# Patient Record
Sex: MALE | Race: WHITE
[De-identification: names, ages, dates, MRNs, and addresses within clinical notes are randomized per-mention and may not be internally consistent; named-entity substitution may affect disease eponyms.]

---

## 2018-04-05 ENCOUNTER — HOSPITAL ENCOUNTER (EMERGENCY)
Dept: HOSPITAL 97 - ER | Age: 78
Discharge: HOME | End: 2018-04-05
Payer: COMMERCIAL
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
## 2018-04-05 DIAGNOSIS — S51.812A: Primary | ICD-10-CM
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
## 2018-04-05 PROCEDURE — 99282 EMERGENCY DEPT VISIT SF MDM: CPT

## 2018-04-05 PROCEDURE — 0JQH0ZZ REPAIR LEFT LOWER ARM SUBCUTANEOUS TISSUE AND FASCIA, OPEN APPROACH: ICD-10-PCS

## 2018-04-05 NOTE — ER
Nurse's Notes                                                                                     

 [facility]                                                                

Name: Grupo Dumont                                                                              

Age: 77 yrs                                                                                       

Sex: Male                                                                                         

: 1940                                                                                   

MRN: [id number]                                                                                   

Arrival Date: 2018                                                                          

Time: 14:29                                                                                       

Account#: [id number]                                                                            

Bed 26                                                                                            

Private MD: Jere Aquino                                                                         

Diagnosis: Laceration without foreign body of left forearm                                        

                                                                                                  

Presentation:                                                                                     

                                                                                             

14:37 Presenting complaint: Patient states: was moving sheet iron and got caught on LFA, has  iw  

      small laceration to LFA, dressed and not bleeding at this time. Transition of care:         

      patient was not received from another setting of care. Complicating Factors: There are      

      no complicating factors for this patient. Onset of symptoms was 2018. Care        

      prior to arrival: Bleeding of injury controlled.                                            

14:37 Method Of Arrival: Ambulatory                                                           iw  

14:37 Acuity: ANDRES 4                                                                           iw  

                                                                                                  

Historical:                                                                                       

- Allergies:                                                                                      

14:41 NKA;                                                                                    iw  

- Home Meds:                                                                                      

14:41 None [Active];                                                                          iw  

- PMHx:                                                                                           

14:41 None;                                                                                   iw  

- PSHx:                                                                                           

14:41 Hernia repair;                                                                          iw  

                                                                                                  

- Immunization history:: Adult Immunizations Last tetanus immunization: up to date.               

- Social history:: Smoking status: Patient/guardian denies using tobacco.                         

                                                                                                  

                                                                                                  

Screenin:47 Abuse screen: Denies threats or abuse. Nutritional screening: No deficits noted.        tl3 

      Tuberculosis screening: No symptoms or risk factors identified. Fall Risk None              

      identified.                                                                                 

                                                                                                  

Assessment:                                                                                       

14:47 General: Appears in no apparent distress. comfortable, slender, well groomed, well      tl3 

      developed, well nourished, Behavior is calm, cooperative, appropriate for age. Pain:        

      Complains of pain in dorsal aspect of left forearm. Neuro: Level of Consciousness is        

      awake, alert, obeys commands, confused, Oriented to person, place, time, situation,         

      Appropriate for age. Cardiovascular: Reports None Heart tones S1 S2 present Capillary       

      refill < 3 seconds in bilateral fingers. Respiratory: Airway is patent Trachea midline      

      Respiratory effort is even, unlabored, Respiratory pattern is regular, symmetrical,         

      Breath sounds are clear bilaterally. GI: No signs and/or symptoms were reported             

      involving the gastrointestinal system. : No signs and/or symptoms were reported           

      regarding the genitourinary system. EENT: No signs and/or symptoms were reported            

      regarding the EENT system. Derm: Skin is fragile, Bruising that is dark purple, on          

      multiple sites. Musculoskeletal: No deficits noted. Injury Description: Laceration          

      sustained to dorsal aspect of left forearm is clean, 0.5 to 2.5 cm long, not bleeding.      

15:25 Reassessment: Patient appears in no apparent distress at this time. No changes from     tl3 

      previously documented assessment. Patient and/or family updated on plan of care and         

      expected duration. Pain level reassessed. Patient is alert, oriented x 3, equal             

      unlabored respirations, skin warm/dry/pink. cathy at bedside suturing.                    

16:18 Reassessment: Patient appears in no apparent distress at this time. No changes from     tl3 

      previously documented assessment. Patient and/or family updated on plan of care and         

      expected duration. Pain level reassessed. Patient is alert, oriented x 3, equal             

      unlabored respirations, skin warm/dry/pink. wound dressed and discharge papers signed.      

                                                                                                  

Vital Signs:                                                                                      

14:41 Pulse 69; Resp 16 S; Temp 98.2; Pulse Ox 97% on R/A; Weight 90.26 kg; Height 6 ft.      iw  

      (182.88 cm); Pain 0/10;                                                                     

16:18  / 82; Pulse 67; Resp 18; Pulse Ox 100% ;                                         tl3 

14:41 Body Mass Index 26.99 (90.26 kg, 182.88 cm)                                             iw  

                                                                                                  

ED Course:                                                                                        

14:29 Patient arrived in ED.                                                                  mr  

14:29 Jere Aquino MD is Private Physician.                                                 mr  

14:36 Cathy Erickson NP is PHCP.                                                           pm1 

14:36 Daniel Degroot MD is Attending Physician.                                             pm1 

14:40 Triage completed.                                                                       iw  

14:41 Arm band placed on.                                                                     iw  

14:47 Kristina Jeffries, RN is Primary Nurse.                                                     tl3 

14:47 Patient has correct armband on for positive identification. pt sitting in chair.        tl3 

14:47 No provider procedures requiring assistance completed.                                  tl3 

15:43 Jere Aquino MD is Referral Physician.                                                pm1 

16:18 Patient did not have IV access during this emergency room visit.                        tl3 

                                                                                                  

Administered Medications:                                                                         

No medications were administered                                                                  

                                                                                                  

                                                                                                  

Outcome:                                                                                          

15:44 Discharge ordered by MD.                                                                pm1 

16:18 Discharged to home ambulatory.                                                          tl3 

16:18 Condition: good                                                                             

16:18 Discharge instructions given to patient, Instructed on discharge instructions, follow       

      up and referral plans. medication usage, Demonstrated understanding of instructions,        

      follow-up care, medications, wound care, Prescriptions given X 1.                           

16:19 Patient left the ED.                                                                    tl3 

                                                                                                  

Signatures:                                                                                       

Brittany Flores Irene, RN                     RN   iw                                                   

Cathy Erickson, ROBERT                    NP   pm1                                                  

Kristina Jeffries RN                       RN   tl3                                                  

                                                                                                  

**************************************************************************************************

## 2018-04-05 NOTE — EDPHYS
Physician Documentation                                                                           

 [facility]                                                                

Name: Grupo Dumont                                                                              

Age: 77 yrs                                                                                       

Sex: Male                                                                                         

: 1940                                                                                   

MRN: [id number]                                                                                   

Arrival Date: 2018                                                                          

Time: 14:29                                                                                       

Account#: [id number]                                                                            

Bed 26                                                                                            

Private MD: Jere Aquino ED Physician Daniel Degroot                                                                      

HPI:                                                                                              

                                                                                             

16:00 This 77 yrs old  Male presents to ER via Ambulatory with complaints of         pm1 

      Laceration To Arm.                                                                          

16:00 The patient has a laceration related to: working, occurred at work, and there are no    pm1 

      complicating factors. The laceration(s) is(are) located on the dorsal aspect of left        

      forearm. Onset: The symptoms/episode began/occurred 1 hour(s) ago. Associated signs and     

      symptoms: Pertinent negatives: deformity, heavy bleeding, numbness distal to injury,        

      suspected foreign body. Patient was working with some flashing and it cut his left          

      forearm. patient with recent tetanus immunization < 5 years old.                            

                                                                                                  

Historical:                                                                                       

- Allergies:                                                                                      

14:41 NKA;                                                                                    iw  

- Home Meds:                                                                                      

14:41 None [Active];                                                                          iw  

- PMHx:                                                                                           

14:41 None;                                                                                   iw  

- PSHx:                                                                                           

14:41 Hernia repair;                                                                          iw  

                                                                                                  

- Immunization history:: Adult Immunizations Last tetanus immunization: up to date.               

- Social history:: Smoking status: Patient/guardian denies using tobacco.                         

                                                                                                  

                                                                                                  

ROS:                                                                                              

16:00 Constitutional: Negative for fever, chills, and weight loss, Eyes: Negative for injury, pm1 

      pain, redness, and discharge, ENT: Negative for injury, pain, and discharge, Neck:          

      Negative for injury, pain, and swelling, Cardiovascular: Negative for chest pain,           

      palpitations, and edema, Respiratory: Negative for shortness of breath, cough,              

      wheezing, and pleuritic chest pain, Abdomen/GI: Negative for abdominal pain, nausea,        

      vomiting, diarrhea, and constipation, Back: Negative for injury and pain, MS/Extremity:     

      Negative for injury and deformity.                                                          

16:00 Neuro: Negative for headache, weakness, numbness, tingling, and seizure.                    

16:00 Skin: Positive for laceration(s), of the dorsal aspect of left forearm.                     

                                                                                                  

Exam:                                                                                             

16:00 Constitutional:  This is a well developed, well nourished patient who is awake, alert,  pm1 

      and in no acute distress. Head/Face:  Normocephalic, atraumatic. Chest/axilla:  Normal      

      chest wall appearance and motion.  Nontender with no deformity.  No lesions are             

      appreciated. Cardiovascular:  Regular rate and rhythm with a normal S1 and S2.  No          

      gallops, murmurs, or rubs.  Normal PMI, no JVD.  No pulse deficits. Respiratory:  Lungs     

      have equal breath sounds bilaterally, clear to auscultation and percussion.  No rales,      

      rhonchi or wheezes noted.  No increased work of breathing, no retractions or nasal          

      flaring. Back:  No spinal tenderness.  No costovertebral tenderness.  Full range of         

      motion.                                                                                     

16:00 Skin: injury, laceration(s), of the dorsal aspect of left forearm.                          

                                                                                                  

Vital Signs:                                                                                      

14:41 Pulse 69; Resp 16 S; Temp 98.2; Pulse Ox 97% on R/A; Weight 90.26 kg; Height 6 ft.      iw  

      (182.88 cm); Pain 0/10;                                                                     

16:18  / 82; Pulse 67; Resp 18; Pulse Ox 100% ;                                         tl3 

14:41 Body Mass Index 26.99 (90.26 kg, 182.88 cm)                                             iw  

                                                                                                  

Laceration:                                                                                       

15:46 Wound Repair of 3cm ( 1.2in ) subcutaneous laceration to dorsal aspect of left forearm. pm1 

      Linear shaped.. Distal neuro/vascular/tendon intact. Anesthesia: Local anesthetic           

      administered with 2 mls of 1% lidocaine. Wound prep: Extensive cleansing by me, Wound       

      irrigation by me, Wound explored extensively, Copious irrigation. Skin closed with 5        

      4-0 Prolene using simple sutures and sterile technique. Dressed with Bacitracin, 4x4's.     

      Patient tolerated well.                                                                     

                                                                                                  

MDM:                                                                                              

14:36 Patient medically screened.                                                             pm1 

15:42 Data reviewed: vital signs. Data interpreted: Pulse oximetry: on room air is 97 %.      pm1 

      Interpretation: normal. Counseling: I had a detailed discussion with the patient and/or     

      guardian regarding: the historical points, exam findings, and any diagnostic results        

      supporting the discharge/admit diagnosis, the need for outpatient follow up, to return      

      to the emergency department if symptoms worsen or persist or if there are any questions     

      or concerns that arise at home.                                                             

                                                                                                  

                                                                                             

14:50 Order name: Prolene, Sutures; Complete Time: 14:56                                      pm1 

                                                                                             

14:50 Order name: Dressing - Wound; Complete Time: 14:56                                      pm1 

                                                                                             

14:50 Order name: Gloves, Sterile; Complete Time: 14:56                                       pm1 

                                                                                             

14:50 Order name: Setup Suture Tray; Complete Time: 14:56                                     pm1 

                                                                                                  

Administered Medications:                                                                         

No medications were administered                                                                  

                                                                                                  

                                                                                                  

Disposition:                                                                                      

                                                                                             

10:24 Co-signature as Attending Physician, Daniel Degroot MD I agree with the assessment and  ana rosa 

      plan of care.                                                                               

                                                                                                  

Disposition:                                                                                      

18 15:44 Discharged to Home. Impression: Laceration without foreign body of left forearm.   

- Condition is Stable.                                                                            

- Discharge Instructions: Laceration Care, Adult.                                                 

- Prescriptions for Keflex 500 mg Oral Capsule - take 1 capsule by ORAL route every 12            

  hours for 10 days; 20 capsule.                                                                  

- Medication Reconciliation Form, Thank You Letter, Antibiotic Education form.                    

- Follow up: Emergency Department; When: 2 - 3 days; Reason: Recheck today's                      

  complaints, Continuance of care, Re-evaluation by your physician. Follow up: Jere Aquino MD; When: 7 - 10 days; Reason: Wound Recheck, Recheck today's complaints,                

  Continuance of care, Staple/Suture removal, Re-evaluation by your physician.                    

- Problem is new.                                                                                 

- Symptoms have improved.                                                                         

                                                                                                  

                                                                                                  

                                                                                                  

Signatures:                                                                                       

Daniel Degroot MD MD cha Williams, Irene, RN                     Josh Parisi NP                    NP   pm1                                                  

Kristina Jeffries, JOSE J                       RN   tl3                                                  

                                                                                                  

**************************************************************************************************

## 2018-08-27 ENCOUNTER — HOSPITAL ENCOUNTER (OUTPATIENT)
Dept: HOSPITAL 97 - OR | Age: 78
Discharge: HOME | End: 2018-08-27
Attending: OPHTHALMOLOGY
Payer: COMMERCIAL

## 2018-08-27 DIAGNOSIS — H25.11: Primary | ICD-10-CM

## 2018-08-27 DIAGNOSIS — E78.5: ICD-10-CM

## 2018-08-27 DIAGNOSIS — H52.221: ICD-10-CM

## 2018-08-27 DIAGNOSIS — Z87.891: ICD-10-CM

## 2018-08-27 DIAGNOSIS — H25.041: ICD-10-CM

## 2018-08-27 PROCEDURE — 0898XZZ DRAINAGE OF RIGHT CORNEA, EXTERNAL APPROACH: ICD-10-PCS

## 2018-08-27 PROCEDURE — 66984 XCAPSL CTRC RMVL W/O ECP: CPT

## 2018-08-27 PROCEDURE — 66999 UNLISTED PX ANT SEGMENT EYE: CPT

## 2018-08-27 PROCEDURE — 08RJ3JZ REPLACEMENT OF RIGHT LENS WITH SYNTHETIC SUBSTITUTE, PERCUTANEOUS APPROACH: ICD-10-PCS

## 2018-08-27 NOTE — OP
Date of Procedure:  08/27/2018



Surgeon:  Leatha Geiger MD



Anesthesiologist:  1. Andreia Alexander CRNA. 

2. Amrik Mckay M.D.



Preoperative Diagnosis:  Nuclear sclerotic cataract, right eye; posterior 
subcapsular cataract, right eye.



Operation Performed:  Phacoemulsification with intraocular lens implant, right 
eye.



Anesthesia:  Per cataract surgery.



Complications:  None.



Description Of Procedure:  In the operating room the patient was prepped and 
draped in the usual sterile fashion for ophthalmic surgery.  A lid speculum was 
placed in the right eye.  Two paracentesis sites were made superiorly and 
inferiorly in the limbal cornea.  Viscoat was placed in the anterior chamber 
and a crescent blade was used to make a corneal groove and tunnel, and a 
keratome was used to enter the anterior chamber.  Provisc was placed in the 
anterior chamber and a 360 degree capsulotomy was performed with a cystitome.  
The lens was hydrodissected with BSS and rotated freely.  The lens was removed 
with a stop and chop technique.  9.01 phaco CDE was used to remove the lens.  
Residual cortex was removed with the irrigation and aspiration.  Provisc was 
placed in the capsular bag.  A ZCB00 +22.0 lens was placed in the capsular bag 
without complications.  Irrigation and aspiration was used to remove residual 
viscoelastic.  The paracentesis sites were hydrated with BSS.  The wound and 
paracentesis sites were inspected and found to be watertight.  Vigamox 0.07 cc 
was placed intracamerally at the end of the procedure.  The eye was irrigated 
with balanced salt solution.  The eye was patched with a soft cotton patch and 
Brooks metal shield. 



The patient was returned to day surgery in good condition.



Comments:  A limbal relaxing incision was created at 19 degrees, a 35-degree 
arc was created with a 600 micron blade.  Akten  was placed in the eye in Day 
surgery and irrigated out the eye with BSS in the OR.  Preservative-free 1% 
lidocaine was placed in the anterior chamber prior to Viscoat.  During 
phacoemulsification, the conjunctiva began ballooning and was incised with 
Michelle scissors.  This was reapproximated with cautery at the end of the 
procedure.  Trace residual PSC remained at the end of the procedure.



Discharge Instructions:  Mr. Dumont is discharged to home in good condition 

and is to follow up with Dr. Geiger in the morning.





JOY/KARMEN

DD:  08/27/2018 10:00:49   Voice ID:  [id number]

DT:  08/27/2018 21:12:57   Report ID:  [id number]

RON

## 2018-08-27 NOTE — P.BOP
Preoperative diagnosis: Nuclear sclerotic cataract and regular astigmatism OD


Postoperative diagnosis: Same


Primary procedure: Phacoemulsification with IOL OD and limbal relaxing incision 

OD


Estimated blood loss: None


Anesthesia: Local (Topical with anesthesia for cataract surgery)


Complications: None


Implants: ZCB00 +22.0


Transferred to: Other (Day surgery)


Condition: Good

## 2020-01-09 ENCOUNTER — HOSPITAL ENCOUNTER (EMERGENCY)
Dept: HOSPITAL 97 - ER | Age: 80
Discharge: HOME | End: 2020-01-09
Payer: MEDICARE

## 2020-01-09 VITALS — SYSTOLIC BLOOD PRESSURE: 141 MMHG | OXYGEN SATURATION: 100 % | TEMPERATURE: 97.7 F | DIASTOLIC BLOOD PRESSURE: 87 MMHG

## 2020-01-09 DIAGNOSIS — R33.9: Primary | ICD-10-CM

## 2020-01-09 DIAGNOSIS — I10: ICD-10-CM

## 2020-01-09 PROCEDURE — 99284 EMERGENCY DEPT VISIT MOD MDM: CPT

## 2020-01-09 PROCEDURE — 81003 URINALYSIS AUTO W/O SCOPE: CPT

## 2020-01-09 PROCEDURE — 51702 INSERT TEMP BLADDER CATH: CPT

## 2020-01-09 PROCEDURE — 87086 URINE CULTURE/COLONY COUNT: CPT

## 2020-01-09 PROCEDURE — 87088 URINE BACTERIA CULTURE: CPT

## 2020-01-09 NOTE — ER
Nurse's Notes                                                                                     

 [facility]                                                                 

Name: Grupo Dumont                                                                              

Age: 79 yrs                                                                                       

Sex: Male                                                                                         

: 1940                                                                                   

MRN: [id number]                                                                                   

Arrival Date: 2020                                                                          

Time: 12:21                                                                                       

Account#: [id number]                                                                            

Bed 14                                                                                            

Private MD: Jere Aquino                                                                         

Diagnosis: Retention of urine                                                                     

                                                                                                  

Presentation:                                                                                     

                                                                                             

12:44 Presenting complaint: Patient states: pt reports having difficulty voiding for 1 day,   sg  

      pt states he is post op right knee replacement and has medications that have a diuretic     

      effect and is concerned because he is not emptying his bladder that it could be a           

      problem. Transition of care: patient was not received from another setting of care.         

      Onset of symptoms was 2020. Risk Assessment: Do you want to hurt yourself       

      or someone else? Patient reports no desire to harm self or others. Initial Sepsis           

      Screen: Does the patient meet any 2 criteria? HR > 90 bpm. Does the patient have a          

      suspected source of infection? No. Patient's initial sepsis screen is negative. Care        

      prior to arrival: None.                                                                     

12:44 Method Of Arrival: Ambulatory                                                           sg  

12:44 Acuity: ANDRES 3                                                                           sg  

                                                                                                  

Historical:                                                                                       

- Allergies:                                                                                      

12:43 NKA;                                                                                    sg  

- PMHx:                                                                                           

12:43 Hypertension;                                                                           sg  

- PSHx:                                                                                           

12:43 Hernia repair; Right knee sx; cataract sx;                                              sg  

                                                                                                  

- Immunization history:: Adult Immunizations unknown.                                             

- Social history:: Smoking status: Patient/guardian denies using tobacco.                         

- Ebola Screening: : No symptoms or risks identified at this time.                                

                                                                                                  

                                                                                                  

Screenin:30 Abuse screen: Denies threats or abuse. Denies injuries from another. Nutritional        ph  

      screening: No deficits noted. Tuberculosis screening: No symptoms or risk factors           

      identified. Fall Risk None identified.                                                      

                                                                                                  

Assessment:                                                                                       

13:30 General: Appears in no apparent distress. uncomfortable, slender, well groomed,         ph  

      Behavior is calm, cooperative, appropriate for age. Pain: Complains of pain in              

      suprapubic area. Neuro: Level of Consciousness is awake, alert, obeys commands,             

      Oriented to person, place, time, situation. Cardiovascular: Capillary refill < 3            

      seconds in bilateral fingers Patient's skin is warm and dry. Respiratory: Airway is         

      patent Respiratory effort is even, unlabored. : Reports inability to void, since this     

      morning. Derm: Skin is intact, is healthy with good turgor, Skin is pink, warm \T\ dry.     

      Musculoskeletal: Circulation, motion, and sensation intact. Range of motion: intact in      

      all extremities.                                                                            

14:15 Reassessment: Patient appears in no apparent distress at this time. Patient and/or      ph  

      family updated on plan of care and expected duration. Pain level reassessed. Patient is     

      alert, oriented x 3, equal unlabored respirations, skin warm/dry/pink. Pereira carheter       

      in place, 950 mL urine output noted, pt reports relief in suprapubic pressure, leg bag      

      placed to L leg.                                                                            

                                                                                                  

Vital Signs:                                                                                      

12:41  / 87; Pulse 107; Resp 18; Temp 97.7; Pulse Ox 100% on R/A; Weight 88.45 kg (R);  sg  

      Height 5 ft. 11 in. (180.34 cm);                                                            

14:20  / 72; Pulse 87; Resp 18; Temp 98.0; Pulse Ox 99% on R/A;                         ph  

12:41 Body Mass Index 27.20 (88.45 kg, 180.34 cm)                                               

                                                                                                  

ED Course:                                                                                        

12:21 Patient arrived in ED.                                                                  am2 

12:21 Jere Aquino MD is Private Physician.                                                 am2 

12:43 Arm band placed on.                                                                       

12:46 Triage completed.                                                                         

12:54 Daniel Degroot MD is Attending Physician.                                             [facility] 

12:58 Patient has correct armband on for positive identification. Bed in low position. Call   [facility] 

      light in reach. Side rails up X 1. Adult w/ patient. Pulse ox on. NIBP on.                  

12:58 Bladder scan completed. 342.                                                            5 

12:59 Urine Dipstick--Ancillary (enter results) Sent.                                         [facility] 

13:10 Jere Aquino MD is Referral Physician.                                                [facility] 

13:10 Jennifer Pisano MD is Referral Physician.                                              [facility] 

13:23 Vanesa Randle, RN is Primary Nurse.                                                    ph  

14:00 Pereira cath inserted, using sterile technique, 16 Fr., by me, balloon inflated, to       ph  

      gravity drainage, returned clear yellow urine. Patient tolerated poorly. 900 mL drained     

      from bladder.                                                                               

14:20 No provider procedures requiring assistance completed. Patient did not have IV access   ph  

      during this emergency room visit.                                                           

                                                                                                  

Administered Medications:                                                                         

14:00 Drug: Flomax 0.4 mg Route: PO;                                                          ph  

14:21 Follow up: Response: No adverse reaction                                                ph  

14:00 Drug: Cipro 500 mg Route: PO;                                                           ph  

14:22 Follow up: Response: No adverse reaction                                                ph  

14:10 Drug: Viscous Lidocaine Liquid (4 %) 5 ml Route: Mucous Membrane;                       ph  

14:23 Follow up: Response: No adverse reaction                                                ph  

                                                                                                  

                                                                                                  

Outcome:                                                                                          

13:10 Discharge ordered by MD. oseguera 

14:24 Patient left the ED.                                                                    ph  

14:24 Discharged to home ambulatory, with significant other.                                  ph  

14:24 Condition: improved                                                                         

14:24 Discharge instructions given to patient, significant other, Instructed on discharge         

      instructions, follow up and referral plans. medication usage, Pereira care Demonstrated       

      understanding of instructions, follow-up care, medications, Prescriptions given X 2.        

                                                                                                  

Signatures:                                                                                       

Marcial Hankins RN RN sg Anderson, Corey, MD MD cha Hall, Patricia, RN RN ph Martinez, Maria                              [facility]                                                  

Bia Barry                               [facility]                                                  

                                                                                                  

Corrections: (The following items were deleted from the chart)                                    

12:46 12:44 Initial Sepsis Screen: Does the patient meet any 2 criteria? HR > 90 bpm. Does    sg  

      the patient have a suspected source of infection? Yes: Dysuria/Frequency/Urgency/UTI sg     

                                                                                                  

**************************************************************************************************

## 2020-01-09 NOTE — XMS REPORT
Patient Summary Document

 Created on:2020



Patient:SID TRAORE

Sex:Male

:1940

External Reference #:[id number]





Demographics







 Address  [address] [place] [address]

 

 Home Phone  [phone]

 

 Work Phone  [phone]

 

 Email Address  [email or web address]

 

 Preferred Language  Unknown

 

 Marital Status  Unknown

 

 Sikh Affiliation  Unknown

 

 Race  Unknown

 

 Additional Race(s)  Unavailable

 

 Ethnic Group  Unknown









Author







 Organization  [facility]

 

 Address  [address]



   [place] [address]

 

 Phone  [phone]









Support







 Name  Relationship  Address  Phone

 

 RIANA TRAORE  Unavailable  [address]  [phone]



     [place] [address]  

 

 RIANA TRAORE  Unavailable  [address]   [phone]



     [place] [address]  









Care Team Providers







 Name  Role  Phone

 

 Unavailable  Unavailable  Unavailable









Payers







 Payer Name  Policy Type  Policy Number  Effective Date  Expiration Date







Problems

This patient has no known problems.



Allergies, Adverse Reactions, Alerts







 Allergy  Allergy  Status  Severity  Reaction(s)  Onset  Inactive  Treating  
Comments



 Name  Type        Date  Date  Clinician  

 

 No Known  DA  Active  U    2019      



 Allergies          -      



           00:00:0      



           0      

 

 No Known  DA  Active  U    2019      



 Allergies          -      



           00:00:0      



           0      







Medications

This patient has no known medications.



Results







 Test Description  Test Time  Test Comments  Text Results  Atomic Results  
Result Comments









 HGB   HCT  2019 07:36:00    









   Test Item  Value  Reference Range  Comments









 HEMOGLOBIN (test code=HGB)  12.5 g/dL  12-16  

 

 HEMATOCRIT (test code=HCT)  35.7 %  37-47  



AB HIV 1    21:54:00





 Test Item  Value  Reference Range  Comments

 

 AB HIV 1   2 (test  NONREACTIVE  NONREACTIVE  Done by [employer] 4th



 code=QBD11RC)      Gen HIV Ag/Ab Combo Screen



AB HIV  21:54:00





 Test Item  Value  Reference Range  Comments

 

 AB HIV 1 (test code=HIV1AB)  NONREACTIVE  NONREACTIVE  Done by [employer] 
4th



       Gen HIV Ag/Ab Combo Screen



PROTHROMBIN [id number] 17:03:00





 Test Item  Value  Reference Range  Comments

 

 PROTHROMBIN TIME PATIENT  10.9 secs  10.1-12.5  



 (test code=PTP)      

 

 INTERNATIONAL NORMAL RATIO  0.97  <2.0  RECOMMENDED THERAPEUTIC RANGE



 (test code=INR)      FOR ORAL



       ANTICOAGULANTTREATMENT:



          CONDITION



                INRProphylaxis of



       venous thrombosis in



       2.0 - 3.0   high-risk medical



       or surgical patientsTreatment



       of venous thrombosis



           2.0 - 3.0Prevention of



       embolism



       2.0 - 3.0Prevention of



       recurrent embolism, or



       3.0 - 4.5   patients with



       mechanical prosthetic



       intravascular valves



IS PATIENT ON ANTICOAGULANTS ? NHas Lab been notified if Patient is on Heparin 
Drip? NOIf Yes, orderCBC, OCCULT BLOOD, PT every other day NTHROMBOPLASTIN TIME 
[id number] 17:03:00





 Test Item  Value  Reference Range  Comments

 

 PTT ACTIVATED (test code=APTT)  30.6 secs  24.9-37.0  



IS PATIENT ON ANTICOAGULANTS ? NHas Lab been notified if Patient is on Heparin 
Drip? NOIf Yes, orderCBC, OCCULT BLOOD, PT every other day NCOMPREHENSIVE 
METABOLIC [id number] 17:02:00





 Test Item  Value  Reference Range  Comments

 

 SODIUM (test code=NA)  140 mmol/L  136-145  

 

 POTASSIUM (test code=K)  4.0 mmol/L  3.5-5.1  

 

 CHLORIDE (test code=CL)  101.0 mmol/L    

 

 CARBON DIOXIDE (test code=CO2)  26.4 mmol/L  21-32  

 

 GLUCOSE (test code=GLU)  100 mg/dL    

 

 BLOOD UREA NITROGEN (test  21 mg/dL  7-18  



 code=BUN)      

 

 GLOMERULAR FILTRATION RATE  79.0  >60  Unit of measure:



 (test code=GFR)      mL/min/1.73 [id number]



       Range:Healthy Adults



          >90 mL/min/1.73 m2 For



       Chronic Kidney Disease:



        Stage II     Mild



       Decrease in GFR



       60-90     Stage III



       Moderate Decrease in GFR



        30-59     Stage IV



       Severe Decrease in GFR



        15-29     Stage V



       Kidney Failure



         <15

 

 CREATININE (test code=CREAT)  0.92 mg/dL  0.55-1.30  

 

 TOTAL PROTEIN (test code=PROT)  7.4 g/dL  6.4-8.2  

 

 ALBUMIN (test code=ALB)  4.1 g/dL  3.4-5.0  

 

 GLOBULIN (test code=GLOB)  3.3 g/dL  2.2-4.2  

 

 ALBUMIN/GLOBULIN RATIO (test  1.2  0.7-2.0  



 code=A/G)      

 

 CALCIUM (test code=CA)  9.2 mg/dL  8.2-10.1  

 

 BILIRUBIN TOTAL (test  0.82 mg/dL  0.2-1.00  



 code=BILT)      

 

 SGOT/AST (test code=AST)  25.0 U/L  15-37  

 

 SGPT/ALT (test code=ALT)  31.0 U/L  12-78  Please note new normal



       range.

 

 ALKALINE PHOSPHATASE TOTAL  67 U/L    



 (test code=ALKP)      



CBC W/AUTO [id number] 16:21:00





 Test Item  Value  Reference Range  Comments

 

 WHITE BLOOD CELL (test code=WBC)  4.6 K/mm3  5.7-10.5  

 

 RED BLOOD CELL (test code=RBC)  4.65 M/mm3  4.2-5.4  

 

 HEMOGLOBIN (test code=HGB)  14.9 g/dL  12-16  

 

 HEMATOCRIT (test code=HCT)  42.0 %  37-47  

 

 MEAN CELL VOLUME (test code=MCV)  90 fL  80-98  

 

 MEAN CELL HGB (test code=MCH)  32.0 pg  27-34  

 

 MEAN CELL HGB CONCENTRATION (test code=MCHC)  35.5 g/dL  30.8-34.1  

 

 RED CELL DISTRIBUTION WIDTH (test code=RDW)  13.3 %  11-16  

 

 PLT (test code=PLT)  165 K/mm3  130-400  

 

 MEAN PLATELET VOLUME (test code=MPV)  12.0 fL  8.9-12.1  

 

 NEUTROPHIL % (test code=NT%)  64.3 %  45-70  

 

 LYMPHOCYTE % (test code=LY%)  25.1 %  20-40  

 

 MONOCYTE % (test code=MO%)  7.8 %  3-10  

 

 EOSINOPHIL % (test code=EO%)  1.5 %  1-5  

 

 BASOPHIL % (test code=BA%)  0.9 %  0.0-1.1  

 

 NEUTROPHIL # (test code=NT#)  2.95 K/mm3  2.00-7.50  

 

 LYMPHOCYTE # (test code=LY#)  1.15 K/mm3  1.50-4.00  

 

 MONOCYTE # (test code=MO#)  0.36 K/mm3  0.2-0.8  

 

 EOSINOPHIL # (test code=EO#)  0.07 K/mm3  0.04-0.4  

 

 BASOPHIL # (test code=BA#)  0.04 K/mm3  0.02-0.10  

 

 MANUAL DIFF REQUIRED (test code=MDIFF)  NO  MANUAL DIFF  

 

 NUCLEATED RED BLOOD CELL (test code=NRBC)  0 %  0-0

## 2020-01-11 ENCOUNTER — HOSPITAL ENCOUNTER (EMERGENCY)
Dept: HOSPITAL 97 - ER | Age: 80
Discharge: HOME | End: 2020-01-11
Payer: COMMERCIAL

## 2020-01-11 VITALS — DIASTOLIC BLOOD PRESSURE: 79 MMHG | SYSTOLIC BLOOD PRESSURE: 131 MMHG | OXYGEN SATURATION: 98 %

## 2020-01-11 DIAGNOSIS — I10: ICD-10-CM

## 2020-01-11 DIAGNOSIS — R33.9: Primary | ICD-10-CM

## 2020-01-11 PROCEDURE — 99281 EMR DPT VST MAYX REQ PHY/QHP: CPT

## 2020-01-11 NOTE — XMS REPORT
Patient Summary Document

 Created on:2020



Patient:SID TRAORE

Sex:Male

:1940

External Reference #:[id number]





Demographics







 Address  [address] [place] [address]

 

 Home Phone  [phone]

 

 Work Phone  [phone]

 

 Email Address  [email or web address]

 

 Preferred Language  Unknown

 

 Marital Status  Unknown

 

 Ep[employer] Affiliation  Unknown

 

 Race  Unknown

 

 Additional Race(s)  Unavailable

 

 Ethnic Group  Unknown









Author







 Organization  [facility]

 

 Address  [address]



   [place] [address]

 

 Phone  [phone]









Support







 Name  Relationship  Address  Phone

 

 RIANA TRAORE  Unavailable  [address]  [phone]



     [place] [address]  

 

 RIANA TRAORE  Unavailable  [address]   [phone]



     [place] [address]  









Care Team Providers







 Name  Role  Phone

 

 Unavailable  Unavailable  Unavailable









Payers







 Payer Name  Policy Type  Policy Number  Effective Date  Expiration Date







Problems

This patient has no known problems.



Allergies, Adverse Reactions, Alerts







 Allergy  Allergy  Status  Severity  Reaction(s)  Onset  Inactive  Treating  
Comments



 Name  Type        Date  Date  Clinician  

 

 No Known  DA  Active  U    2019      



 Allergies          -      



           00:00:0      



           0      

 

 No Known  DA  Active  U    2019      



 Allergies          -      



           00:00:0      



           0      







Medications

This patient has no known medications.



Results







 Test Description  Test Time  Test Comments  Text Results  Atomic Results  
Result Comments









 HGB   HCT  2019 07:36:00    









   Test Item  Value  Reference Range  Comments









 HEMOGLOBIN (test code=HGB)  12.5 g/dL  12-16  

 

 HEMATOCRIT (test code=HCT)  35.7 %  37-47  



AB HIV  21:54:00





 Test Item  Value  Reference Range  Comments

 

 AB HIV 1 (test code=HIV1AB)  NONREACTIVE  NONREACTIVE  Done by [employer] 
4th



       Gen HIV Ag/Ab Combo Screen



AB HIV 1    21:54:00





 Test Item  Value  Reference Range  Comments

 

 AB HIV 1   2 (test  NONREACTIVE  NONREACTIVE  Done by [employer] 4th



 code=BSY88CQ)      Gen HIV Ag/Ab Combo Screen



PROTHROMBIN [id number] 17:03:00





 Test Item  Value  Reference Range  Comments

 

 PROTHROMBIN TIME PATIENT  10.9 secs  10.1-12.5  



 (test code=PTP)      

 

 INTERNATIONAL NORMAL RATIO  0.97  <2.0  RECOMMENDED THERAPEUTIC RANGE



 (test code=INR)      FOR ORAL



       ANTICOAGULANTTREATMENT:



          CONDITION



                INRProphylaxis of



       venous thrombosis in



       2.0 - 3.0   high-risk medical



       or surgical patientsTreatment



       of venous thrombosis



           2.0 - 3.0Prevention of



       embolism



       2.0 - 3.0Prevention of



       recurrent embolism, or



       3.0 - 4.5   patients with



       mechanical prosthetic



       intravascular valves



IS PATIENT ON ANTICOAGULANTS ? NHas Lab been notified if Patient is on Heparin 
Drip? NOIf Yes, orderCBC, OCCULT BLOOD, PT every other day NTHROMBOPLASTIN TIME 
[id number] 17:03:00





 Test Item  Value  Reference Range  Comments

 

 PTT ACTIVATED (test code=APTT)  30.6 secs  24.9-37.0  



IS PATIENT ON ANTICOAGULANTS ? NHas Lab been notified if Patient is on Heparin 
Drip? NOIf Yes, orderCBC, OCCULT BLOOD, PT every other day NCOMPREHENSIVE 
METABOLIC [id number] 17:02:00





 Test Item  Value  Reference Range  Comments

 

 SODIUM (test code=NA)  140 mmol/L  136-145  

 

 POTASSIUM (test code=K)  4.0 mmol/L  3.5-5.1  

 

 CHLORIDE (test code=CL)  101.0 mmol/L    

 

 CARBON DIOXIDE (test code=CO2)  26.4 mmol/L  21-32  

 

 GLUCOSE (test code=GLU)  100 mg/dL    

 

 BLOOD UREA NITROGEN (test  21 mg/dL  7-18  



 code=BUN)      

 

 GLOMERULAR FILTRATION RATE  79.0  >60  Unit of measure:



 (test code=GFR)      mL/min/1.73 [id number]



       Range:Healthy Adults



          >90 mL/min/1.73 m2 For



       Chronic Kidney Disease:



        Stage II     Mild



       Decrease in GFR



       60-90     Stage III



       Moderate Decrease in GFR



        30-59     Stage IV



       Severe Decrease in GFR



        15-29     Stage V



       Kidney Failure



         <15

 

 CREATININE (test code=CREAT)  0.92 mg/dL  0.55-1.30  

 

 TOTAL PROTEIN (test code=PROT)  7.4 g/dL  6.4-8.2  

 

 ALBUMIN (test code=ALB)  4.1 g/dL  3.4-5.0  

 

 GLOBULIN (test code=GLOB)  3.3 g/dL  2.2-4.2  

 

 ALBUMIN/GLOBULIN RATIO (test  1.2  0.7-2.0  



 code=A/G)      

 

 CALCIUM (test code=CA)  9.2 mg/dL  8.2-10.1  

 

 BILIRUBIN TOTAL (test  0.82 mg/dL  0.2-1.00  



 code=BILT)      

 

 SGOT/AST (test code=AST)  25.0 U/L  15-37  

 

 SGPT/ALT (test code=ALT)  31.0 U/L  12-78  Please note new normal



       range.

 

 ALKALINE PHOSPHATASE TOTAL  67 U/L    



 (test code=ALKP)      



CBC W/AUTO [id number] 16:21:00





 Test Item  Value  Reference Range  Comments

 

 WHITE BLOOD CELL (test code=WBC)  4.6 K/mm3  5.7-10.5  

 

 RED BLOOD CELL (test code=RBC)  4.65 M/mm3  4.2-5.4  

 

 HEMOGLOBIN (test code=HGB)  14.9 g/dL  12-16  

 

 HEMATOCRIT (test code=HCT)  42.0 %  37-47  

 

 MEAN CELL VOLUME (test code=MCV)  90 fL  80-98  

 

 MEAN CELL HGB (test code=MCH)  32.0 pg  27-34  

 

 MEAN CELL HGB CONCENTRATION (test code=MCHC)  35.5 g/dL  30.8-34.1  

 

 RED CELL DISTRIBUTION WIDTH (test code=RDW)  13.3 %  11-16  

 

 PLT (test code=PLT)  165 K/mm3  130-400  

 

 MEAN PLATELET VOLUME (test code=MPV)  12.0 fL  8.9-12.1  

 

 NEUTROPHIL % (test code=NT%)  64.3 %  45-70  

 

 LYMPHOCYTE % (test code=LY%)  25.1 %  20-40  

 

 MONOCYTE % (test code=MO%)  7.8 %  3-10  

 

 EOSINOPHIL % (test code=EO%)  1.5 %  1-5  

 

 BASOPHIL % (test code=BA%)  0.9 %  0.0-1.1  

 

 NEUTROPHIL # (test code=NT#)  2.95 K/mm3  2.00-7.50  

 

 LYMPHOCYTE # (test code=LY#)  1.15 K/mm3  1.50-4.00  

 

 MONOCYTE # (test code=MO#)  0.36 K/mm3  0.2-0.8  

 

 EOSINOPHIL # (test code=EO#)  0.07 K/mm3  0.04-0.4  

 

 BASOPHIL # (test code=BA#)  0.04 K/mm3  0.02-0.10  

 

 MANUAL DIFF REQUIRED (test code=MDIFF)  NO  MANUAL DIFF  

 

 NUCLEATED RED BLOOD CELL (test code=NRBC)  0 %  0-0

## 2020-01-11 NOTE — EDPHYS
Physician Documentation                                                                           

 [facility]                                                                 

Name: Grupo Dumont                                                                              

Age: 79 yrs                                                                                       

Sex: Male                                                                                         

: 1940                                                                                   

MRN: [id number]                                                                                   

Arrival Date: 2020                                                                          

Time: 11:23                                                                                       

Account#: [id number]                                                                            

Bed 14                                                                                            

Private MD: Jere Aquino                                                                         

ED Physician Raciel Allen                                                                         

HPI:                                                                                              

                                                                                             

11:42 This 79 yrs old  Male presents to ER via Ambulatory with complaints of needs   rn  

      hernandez catheter pulled out.                                                                  

11:42 The patient presents with urinary symptoms. Onset: The symptoms/episode began/occurred  rn  

      2 day(s) ago. Modifying factors: The symptoms are alleviated by nothing, the symptoms       

      are aggravated by nothing. Severity of symptoms: At their worst the symptoms were mild,     

      in the emergency department the symptoms have improved. The patient has not experienced     

      similar symptoms in the past. The patient has been recently seen at the [facility] Emergency Department. Reports seen here 2 days ago for urinary       

      retention, had 800cc of urine in bladder when drained, no UTI, thought maybe from meds      

      after surgery, told to return in 2 days here for removal. No fever, is flowing well,        

      taking bactrim. .                                                                           

                                                                                                  

Historical:                                                                                       

- Allergies:                                                                                      

11: NKA;                                                                                    sv  

- PMHx:                                                                                           

11:26 Hypertension;                                                                           sv  

- PSHx:                                                                                           

11:26 Hernia repair; Right knee sx; cataract sx;                                              sv  

                                                                                                  

- Family history:: not pertinent.                                                                 

- Hospitalizations: : No recent hospitalization is reported.                                      

                                                                                                  

                                                                                                  

ROS:                                                                                              

11:42 Constitutional: Negative for fever, chills, and weight loss, Abdomen/GI: Negative for   rn  

      abdominal pain, nausea, vomiting, diarrhea, and constipation, : Negative for injury,      

      bleeding, discharge, and swelling.                                                          

                                                                                                  

Exam:                                                                                             

11:42 Constitutional:  This is a well developed, well nourished patient who is awake, alert,  rn  

      and in no acute distress.  Ambulatory to room without difficulty.  Abdomen/GI:  soft,       

      non-tender, non-distended Male :  Hernandez catheter in place and draining.                   

                                                                                                  

Vital Signs:                                                                                      

11:  / 79; Pulse 98; Resp 16; Pulse Ox 98% ; Weight 87.54 kg; Height 5 ft. 11 in.     sv  

      (180.34 cm);                                                                                

11: Body Mass Index 26.92 (87.54 kg, 180.34 cm)                                             sv  

                                                                                                  

MDM:                                                                                              

11:27 Patient medically screened.                                                             rn  

11:42 Differential diagnosis: urinary retention. Data reviewed: vital signs, nurses notes,    rn  

      old medical records, and as a result, I will discharge patient. Counseling: I had a         

      detailed discussion with the patient and/or guardian regarding: the historical points,      

      exam findings, and any diagnostic results supporting the discharge/admit diagnosis, the     

      need for outpatient follow up, to return to the emergency department if symptoms worsen     

      or persist or if there are any questions or concerns that arise at home. ED course:         

      offered removal of catheter and voiding trial, but patient concerned about having to        

      get hernandez placed again and does not want to go through that again. Told him had decent      

      chance of passing, but ultimately patient and family choose to go home, continue abx        

      and flomax as has only been 2 days, and will f/u with Dr. Pisano who he has seen before.     

      Most likely combination of prostate at baseline and medication recently. .                  

                                                                                                  

Administered Medications:                                                                         

No medications were administered                                                                  

                                                                                                  

                                                                                                  

Disposition:                                                                                      

20 11:47 Discharged to Home as Medical Screen. Impression: Urinary retention.               

- Condition is Stable.                                                                            

- Discharge Instructions: Hernandez Catheter Care, Adult, Acute Urinary Retention, Male.              

                                                                                                  

- Medication Reconciliation Form, Thank You Letter, Antibiotic Education, Prescription            

  Opioid Use form.                                                                                

- Follow up: Jennifer Pisano MD; When: 5 - 6 days; Reason: Recheck today's complaints,           

  Continuance of care, Re-evaluation by your physician.                                           

- Problem is new.                                                                                 

- Symptoms have improved.                                                                         

                                                                                                  

                                                                                                  

                                                                                                  

Signatures:                                                                                       

Blanca Salazar RN RN sv Gay, Steven, RN RN sg Nieto, Roman, MD MD   rn                                                   

                                                                                                  

Corrections: (The following items were deleted from the chart)                                    

12:00 11:47 2020 11:47 Discharged to Home as Medical Screen. Impression: Urinary        sg  

      retention. Condition is Stable. Forms are Medication Reconciliation Form, Thank You         

      Letter, Antibiotic Education, Prescription Opioid Use. Follow up: Jennifer Pisano; When:     

      5 - 6 days; Reason: Recheck today's complaints, Continuance of care, Re-evaluation by       

      your physician. Problem is new. Symptoms have improved. rn                                  

                                                                                                  

**************************************************************************************************

## 2020-01-11 NOTE — ER
Nurse's Notes                                                                                     

 [facility]                                                                 

Name: Grupo Dumont                                                                              

Age: 79 yrs                                                                                       

Sex: Male                                                                                         

: 1940                                                                                   

MRN: [id number]                                                                                   

Arrival Date: 2020                                                                          

Time: :                                                                                       

Account#: [id number]                                                                            

Bed 14                                                                                            

Private MD: Jere Aquino                                                                         

Diagnosis: Urinary retention                                                                      

                                                                                                  

Presentation:                                                                                     

                                                                                             

11:25 Presenting complaint: Patient states: needs urinary catheter removed that was placed    sv  

      here. Transition of care: patient was not received from another setting of care. Onset      

      of symptoms was 2020. Care prior to arrival: None.                              

11:25 Method Of Arrival: Ambulatory                                                           sv  

11:25 Acuity: ANDRES 4                                                                           sv  

                                                                                                  

Historical:                                                                                       

- Allergies:                                                                                      

: NKA;                                                                                    sv  

- PMHx:                                                                                           

11: Hypertension;                                                                           sv  

- PSHx:                                                                                           

11: Hernia repair; Right knee sx; cataract sx;                                              sv  

                                                                                                  

- Family history:: not pertinent.                                                                 

- Hospitalizations: : No recent hospitalization is reported.                                      

                                                                                                  

                                                                                                  

Vital Signs:                                                                                      

11:  / 79; Pulse 98; Resp 16; Pulse Ox 98% ; Weight 87.54 kg; Height 5 ft. 11 in.     sv  

      (180.34 cm);                                                                                

11: Body Mass Index 26.92 (87.54 kg, 180.34 cm)                                             sv  

                                                                                                  

ED Course:                                                                                        

:23 Patient arrived in ED.                                                                  as  

11:23 Jere Aquino MD is Private Physician.                                                 as  

11: Triage completed.                                                                       sv  

11:27 Raciel Allen MD is Attending Physician.                                                rn  

11:27 Marcial Hankins RN is Primary Nurse.                                                       sg  

11:27 Arm band placed on.                                                                     sv  

11:46 Jennifer Pisano MD is Referral Physician.                                              rn  

                                                                                                  

Administered Medications:                                                                         

No medications were administered                                                                  

                                                                                                  

                                                                                                  

Outcome:                                                                                          

11:47 Discharge ordered by MD.                                                                rn  

11:52 Medical screen evaluation completed per provider. Patient declined treatment.           sg  

11:52 Condition: good                                                                             

11:52 Instructed on follow up and referral plans.                                                 

12:00 Patient left the ED.                                                                    sg  

                                                                                                  

Signatures:                                                                                       

Blanca Salazar RN RN   sv                                                   

Marcial Hankins RN                         RN   sg                                                   

Adrianne Owen                             as                                                   

Raciel Allen MD MD   rn                                                   

                                                                                                  

**************************************************************************************************

## 2020-12-07 ENCOUNTER — HOSPITAL ENCOUNTER (INPATIENT)
Dept: HOSPITAL 97 - ER | Age: 80
LOS: 2 days | Discharge: HOME | DRG: 177 | End: 2020-12-09
Admitting: HOSPITALIST
Payer: COMMERCIAL

## 2020-12-07 VITALS — BODY MASS INDEX: 27.8 KG/M2

## 2020-12-07 DIAGNOSIS — Z79.899: ICD-10-CM

## 2020-12-07 DIAGNOSIS — I10: ICD-10-CM

## 2020-12-07 DIAGNOSIS — Z79.84: ICD-10-CM

## 2020-12-07 DIAGNOSIS — Z79.52: ICD-10-CM

## 2020-12-07 DIAGNOSIS — J96.01: ICD-10-CM

## 2020-12-07 DIAGNOSIS — U07.1: Primary | ICD-10-CM

## 2020-12-07 DIAGNOSIS — J12.89: ICD-10-CM

## 2020-12-07 DIAGNOSIS — E87.6: ICD-10-CM

## 2020-12-07 DIAGNOSIS — Z79.01: ICD-10-CM

## 2020-12-07 DIAGNOSIS — E11.9: ICD-10-CM

## 2020-12-07 LAB
ALBUMIN SERPL BCP-MCNC: 2.7 G/DL (ref 3.4–5)
ALP SERPL-CCNC: 74 U/L (ref 45–117)
ALT SERPL W P-5'-P-CCNC: 103 U/L (ref 12–78)
AST SERPL W P-5'-P-CCNC: 93 U/L (ref 15–37)
BLD SMEAR INTERP: (no result)
BUN BLD-MCNC: 21 MG/DL (ref 7–18)
CRP SERPL-MCNC: 166 MG/L (ref ?–3)
FERRITIN SERPL-MCNC: 1582.9 NG/ML (ref 26–388)
GIANT PLATELETS BLD QL SMEAR: (no result)
GLUCOSE SERPLBLD-MCNC: 144 MG/DL (ref 74–106)
HCT VFR BLD CALC: 37.2 % (ref 39.6–49)
INR BLD: 1.06
LIPASE SERPL-CCNC: 250 U/L (ref 73–393)
LYMPHOCYTES # SPEC AUTO: 0.3 K/UL (ref 0.7–4.9)
MORPHOLOGY BLD-IMP: (no result)
PMV BLD: 7 FL (ref 7.6–11.3)
POTASSIUM SERPL-SCNC: 2.9 MMOL/L (ref 3.5–5.1)
RBC # BLD: 4.12 M/UL (ref 4.33–5.43)
TROPONIN (EMERG DEPT USE ONLY): < 0.02 NG/ML (ref 0–0.04)

## 2020-12-07 PROCEDURE — 99285 EMERGENCY DEPT VISIT HI MDM: CPT

## 2020-12-07 PROCEDURE — 83735 ASSAY OF MAGNESIUM: CPT

## 2020-12-07 PROCEDURE — 85610 PROTHROMBIN TIME: CPT

## 2020-12-07 PROCEDURE — 84484 ASSAY OF TROPONIN QUANT: CPT

## 2020-12-07 PROCEDURE — 87040 BLOOD CULTURE FOR BACTERIA: CPT

## 2020-12-07 PROCEDURE — 87804 INFLUENZA ASSAY W/OPTIC: CPT

## 2020-12-07 PROCEDURE — 96361 HYDRATE IV INFUSION ADD-ON: CPT

## 2020-12-07 PROCEDURE — 83605 ASSAY OF LACTIC ACID: CPT

## 2020-12-07 PROCEDURE — 84145 PROCALCITONIN (PCT): CPT

## 2020-12-07 PROCEDURE — 81015 MICROSCOPIC EXAM OF URINE: CPT

## 2020-12-07 PROCEDURE — 93005 ELECTROCARDIOGRAM TRACING: CPT

## 2020-12-07 PROCEDURE — 36415 COLL VENOUS BLD VENIPUNCTURE: CPT

## 2020-12-07 PROCEDURE — 86140 C-REACTIVE PROTEIN: CPT

## 2020-12-07 PROCEDURE — 84132 ASSAY OF SERUM POTASSIUM: CPT

## 2020-12-07 PROCEDURE — 71275 CT ANGIOGRAPHY CHEST: CPT

## 2020-12-07 PROCEDURE — 85379 FIBRIN DEGRADATION QUANT: CPT

## 2020-12-07 PROCEDURE — 84100 ASSAY OF PHOSPHORUS: CPT

## 2020-12-07 PROCEDURE — 85730 THROMBOPLASTIN TIME PARTIAL: CPT

## 2020-12-07 PROCEDURE — 82728 ASSAY OF FERRITIN: CPT

## 2020-12-07 PROCEDURE — 96374 THER/PROPH/DIAG INJ IV PUSH: CPT

## 2020-12-07 PROCEDURE — 80076 HEPATIC FUNCTION PANEL: CPT

## 2020-12-07 PROCEDURE — 94760 N-INVAS EAR/PLS OXIMETRY 1: CPT

## 2020-12-07 PROCEDURE — 71045 X-RAY EXAM CHEST 1 VIEW: CPT

## 2020-12-07 PROCEDURE — 85025 COMPLETE CBC W/AUTO DIFF WBC: CPT

## 2020-12-07 PROCEDURE — 80048 BASIC METABOLIC PNL TOTAL CA: CPT

## 2020-12-07 PROCEDURE — 83690 ASSAY OF LIPASE: CPT

## 2020-12-07 PROCEDURE — 81003 URINALYSIS AUTO W/O SCOPE: CPT

## 2020-12-07 RX ADMIN — APIXABAN SCH MG: 5 TABLET, FILM COATED ORAL at 22:25

## 2020-12-07 RX ADMIN — SODIUM CHLORIDE SCH MLS: 0.9 INJECTION, SOLUTION INTRAVENOUS at 16:50

## 2020-12-07 RX ADMIN — METHYLPREDNISOLONE SODIUM SUCCINATE SCH MG: 40 INJECTION, POWDER, FOR SOLUTION INTRAMUSCULAR; INTRAVENOUS at 22:25

## 2020-12-07 RX ADMIN — Medication SCH MG: at 22:25

## 2020-12-07 RX ADMIN — Medication SCH: at 21:00

## 2020-12-07 NOTE — XMS REPORT
Summary of Care

                          Created on:2020



Patient:Grupo Dumont

Sex:Male

:1940

External Reference #:[id number]





Demographics







                          Address                   [address] [place] [address] [place] [address]

 

                          Home Phone                [phone]

 

                          Email Address             [email or web address]

 

                          Preferred Language        English

 

                          Marital Status            

 

                          [employer] Affiliation     Unknown

 

                          Race                      White

 

                          Ethnic Group              Not  or 









Author







                          Organization              [facility]

 

                          Address                   [address] [place] [id number]









Support







                Name            Relationship    Address         Phone

 

                Beata Dumont Unavailable     Unavailable     [phone]

[phone]

 

                Cesar Dumont Unavailable     Unavailable     Unavailable









Care Team Providers







                    Name                Role                Phone

 

                    Pcp,  Does Not Have A Primary Care Provider [phone]









Reason for Visit







                          Reason                    Comments

 

                          Results                   







Encounter Details







             Date         Type         Department   Care Team    Description

 

                2020      Telephone       [facility] Nydia purdy, Sathish BAHENA MD



                                        Results



                                                            Care



                                        [address] [place] [id number]



                                        



                                                            [place] [address]

836



                                        [phone] (Fax) 







Allergies

No Known Allergiesdocumented as of this encounter (statuses as of 2020)



Medications







          Medication Sig       Dispensed Refills   Start Date End Date  Status

 

          allopurinoL 300 mg tablet                     0         09/10/2020    

       Active

 

          amitriptyline 10 mg tablet                     0         10/30/2020   

        Active

 

          budesonide 0.5 mg/2 mL nebulizer                     0         

020           Active



          solution                                                    

 

          carvediloL 12.5 mg tablet                     0         09/10/2020    

       Active

 

          losartan-hydrochlorothiazide 50-12.5                     0                    Active



          mg per tablet                                                   

 

          metFORMIN 500 mg tablet                     0         09/10/2020      

     Active



documented as of this encounter (statuses as of 2020)



Active Problems

Not on filedocumented as of this encounter (statuses as of 2020)



Social History







             Tobacco Use  Types        Packs/Day    Years Used   Date

 

             Never Smoker                                        









                Smokeless Tobacco: Never Used                                 









                          Sex Assigned at Birth     Date Recorded

 

                          Not on file               









                    COVID-19 Exposure   Response            Date Recorded

 

                    In the last month, have you been in contact with No / Unsure

         2020  2:26 PM

CST



                    someone who was confirmed or suspected to have              

       



                    Coronavirus / COVID-19?                     



documented as of this encounter



Last Filed Vital Signs

Not on filedocumented in this encounter



Miscellaneous Notes

Telephone Encounter - Pina Ramey RN - 2020  5:25 PM ELIJAHGrupo Dumont 
is a 80 year old male who tested positive for Covid-19 and is calling for his 
results. Spoke with patient and his wife regarding below information, voices 
understanding. Denies furtherneeds or concerns.



Your COVID 19 testing results were positive. At this time, the COVID 19 virus 
was detected in your sample. If this is the first time you tested positive for 
COVID 19, we recommend that you remain in self- quarantine along with those in 
your immediate household until you have been contacted by your [facility]'s health 
department who will work with you on when you may discontinue self- quarantine. 
In addition, your company's employee health department may have additional 
requirements for clearance to work. Please work with your [facility] health 
department to address those requirements.



Please follow the advice you received in your After Visit Summary (AVS), the CDC
document on what todo if you are sick with COVID and/or the CDC document on the 
COVID 19 test you received. Please stayinside and preferably in one room. Avoid 
close contact with your family and neighbors to prevent further spread. Wear a 
face mask if in the presence of someone else. Do not share household items such 
as dishes and toiletries. Be sure to clean your space thoroughly and wash your 
hands frequently. If you have symptoms, most people feel better within 7-14 
days. If your symptoms are worsening and you feel very short of breath and you 
have difficulty performing basic tasks such as walking to the bathroomor 
preparing food, we would like you to contact the [facility] at [phone] 
or toll free [phone] to talk with a nurse or, if your symptoms are urgent,
go to the nearest Emergency Room. Please wear a face mask and call prior to 
going to a healthcare facility. The local health department will be contacting 
you soon to follow up. If you are a [facility] or contract employee or student, please
refer to this website for more information 
[email or web address].



If this is not the first positive result you received, please be advised that it
is unclear, at thistime, how long the virus remains detectable in samples. It 
appears it could be weeks before a samplebecomes negative. The date of your 
first positive test and your current symptoms will determine whenyou may 
discontinue quarantine. Please work with the [facility] for 
instructions.



For further guidance on when you can expect to discontinue quarantine and return
to work, please visit the CDC website:  
https://www.cdc.gov/coronavirus/2019-ncov/hcp/disposition-in-home-patients.html.
[facility] recommends the symptom-based strategy for those who have symptoms and the 
time-based strategy for those who do not have symptoms. Repeat testing is not 
routinely recommended for any reason due to the prolonged detection of the virus
in samples without evidence of transmission. General guidance includes release 
from quarantine when the following conditions are met:

? At least 24 hours have passed since recovery defined as resolution of fever 
without the use of fever-reducing medications and

? Improvement in symptoms (e.g., cough, shortness of breath); and,

? At least 10 days have passed since symptoms first appeared or the first 
positive test if symptoms have not developed or worsened since testing, at which
point, use date symptoms began.

? Continue to wear a face mask until 14 days have passed since your first test 
or first symptoms appeared.

? If you experience a worsening of symptoms or recover and then redevelop 
symptoms, please speak with a provider for further evaluation.

Those in your household should remain in quarantine for 14 days to allow time 
for incubation, or, iftesting positive, should follow the above guidelines for 
discontinuing quarantine.



ALICE Tran-

[facility] Triage Nurse

Electronically signed by Pina Ramey RN at 2020  5:31 PM CSTTelephone 
Encounter - Saima Carbajal - 2020  4:30 PM CSTGrupo KAYLI Dumont is a 80 year 
old male whose calling to get his results. Thank you.Electronically signed by 
Saima Carbajal at 2020  4:31 PM CSTdocumented in this encounter



Plan of Treatment







                Health Maintenance Due Date        Last Done       Comments

 

                Depression Screening 1950                      

 

                DTaP,Tdap,and Td Vaccines (1 - Tdap) 1957                 

     

 

                Zoster Recombinant Vaccine (SHINGRIX) (1 of 2) 1988       

               

 

                Medicare Wellness Visit 2003                      

 

                PNEUMOCOCCAL VACCINES 65+ (1 of 1 - PPSV23) 2003          

            

 

                INFLUENZA VACCINE (#1) 2020                      



documented as of this encounter



Results

Not on filedocumented in this encounter



Additional Health Concerns







                Infection       Onset Date      Last Indicated  Resolved Time

 

                COVID-19 Confirmed 2020      



documented as of this encounter



Insurance







          Payer     Benefit Plan / Subscriber ID Effective Phone     Address   T

ype



                    Group               Dates                         

 

          [employer]    [employer] MEDICARE [id number] 2020-Prese                    

 Medicare Adv



          HEALTHCARE COMPLETE            nt                            PPO



          MEDICARE  CHOICE                                            



          ADVANTAGE                                                   



documented as of this encounter

## 2020-12-07 NOTE — XMS REPORT
Continuity of Care Document

                           Created on:2020



Patient:SID TRAORE

Sex:Male

:1940

External Reference #:[id number]





Demographics







                          Address                   [address] [place] [address]

 

                          Home Phone                [phone]

 

                          Work Phone                [phone]

 

                          Email Address             [email or web address]

 

                          Preferred Language        English

 

                          Marital Status            Unknown

 

                          Anabaptist Affiliation     Unknown

 

                          Race                      Unknown

 

                          Additional Race(s)        Unavailable



                                                    White

 

                          Ethnic Group              Unknown









Author







                          Organization              [facility]

t

 

                          Address                   [address] [place] [address] [address]



                                                    [place] [address]

 

                          Phone                     [phone]









Support







                Name            Relationship    Address         Phone

 

                S Champagne     Spouse          Unavailable     [phone]

 

                Julia       Child           Unavailable     Unavailable

 

                CHAMPAGNE       Unavailable     [address] [place] [address]      [phone]



                                                [place] [address] 









Care Team Providers







                    Name                Role                Phone

 

                    Mary STONER,  H       Attending Clinician [phone]

 

                    Lab,  [facility]     Attending Clinician Unavailable









Payers







           Payer Name Policy Type Policy Number Effective Date Expiration Date S

ource







Problems

This patient has no known problems.



Allergies, Adverse Reactions, Alerts







       Allergy Allergy Status Severity Reaction(s) Onset  Inactive Treating Comm

ents 

Source



       Name   Type                        Date   Date   Clinician        

 

       No Known DA     Active U             2019                      [facility]



       Allergie                             2-27                        [place]



       s                                  00:00:                      [facility]



                                          00                          dic



                                                                      [facility]



                                                                      l

 

       No Known DA     Active U             2019                      [facility]



       Allergie                             1-20                        [facility]



       [facility]                                  [facility]:00:                      [facility]



                                          00                          l of



                                                                      [place]







Medications

This patient has no known medications.



Procedures

This patient has no known procedures.



Encounters







        Start   End     Encounter Admission Attending Care    Care    Encounter 

Source



        Date/Time Date/Time Type    Type    Clinicians Facility Department ID   

   

 

        2020 Telephone         [facility]    [phone]

[id number] 



        00:00:00 00:00:00                 [facility] [place]  [id number]         



                                                Surgical [id number]         



                                                Specialti [phone]         



                                                es      370             



                                                [place]                 

 

        2020 Laboratory         Lab, [facility]    [phone] [id number]

[id number] 



        15:15:36 15:35:36 Only            [facility]  [id number]         



                                                [place] [id number]         



                                                [facility] [phone]         



                                                [facility]     044             



                                                Office                  



                                                Building                 



                                                One                     







Results







           Test Description Test Time  Test Comments Results    Result Comments 

Source









                    HGB   HCT           2019 07:36:00 









                      Test Item  Value      Reference Range Interpretation Comme

nts









             HEMOGLOBIN (test code = HGB) 12.5 g/dL    12-16        N           

 

 

             HEMATOCRIT (test code = HCT) 35.7 %       37-47        L           

 



AB HIV  21:54:00





             Test Item    Value        Reference Range Interpretation Comments

 

             AB HIV 1 (test code NONREACTIVE  NONREACTIVE               Done by 

[facility]



             = HIV1AB)                                           4th Gen HIV Ag/

Ab Combo



                                                                 Screen



AB HIV 1    21:54:00





             Test Item    Value        Reference Range Interpretation Comments

 

             AB HIV 1   2 (test NONREACTIVE  NONREACTIVE               Done by [facility]



             code = ITF14FE)                                        4th Gen HIV 

Ag/Ab Combo



                                                                 Screen



PROTHROMBIN [id number] 17:03:00





             Test Item    Value        Reference Range Interpretation Comments

 

             PROTHROMBIN TIME 10.9 secs    10.1-12.5    N            



             PATIENT (test code =                                        



             PTP)                                                

 

             INTERNATIONAL NORMAL 0.97         <2.0                      RECOMME

NDED THERAPEUTIC



             RATIO (test code =                                        RANGE FOR

 ORAL



             INR)                                                ANTICOAGULANTTR

EATMENT:



                                                                           CONDI

TION



                                                                 



                                                                 INRProphylaxis 

of



                                                                 venous thrombos

is in



                                                                       2.0 - 3.0



                                                                 high-risk medic

al or



                                                                 surgical



                                                                 patientsTreatme

nt of



                                                                 venous thrombos

is



                                                                         2.0 -



                                                                 3.0Prevention o

f



                                                                 embolism



                                                                        2.0 -



                                                                 3.0Prevention o

f



                                                                 recurrent embol

ism, or



                                                                        3.0 - 4.

5



                                                                 patients with



                                                                 mechanical pros

thetic



                                                                 intravascular v

anand



IS PATIENT ON ANTICOAGULANTS ? NHas Lab been notified if Patient is on Heparin 
Drip? NOIf Yes, orderCBC, OCCULT BLOOD, PT every other day NTHROMBOPLASTIN TIME 
[id number] 17:03:00





             Test Item    Value        Reference Range Interpretation Comments

 

             PTT ACTIVATED (test code = APTT) 30.6 secs    24.9-37.0    N       

     



IS PATIENT ON ANTICOAGULANTS ? NHas Lab been notified if Patient is on Heparin 
Drip? NOIf Yes, orderCBC, OCCULT BLOOD, PT every other day NCOMPREHENSIVE 
METABOLIC [id number] 17:02:00





             Test Item    Value        Reference Range Interpretation Comments

 

             SODIUM (test code = 140 mmol/L   136-145      N            



             NA)                                                 

 

             POTASSIUM (test code = 4.0 mmol/L   3.5-5.1      N            



             K)                                                  

 

             CHLORIDE (test code = 101.0 mmol/L        N            



             CL)                                                 

 

             CARBON DIOXIDE (test 26.4 mmol/L  21-32        N            



             code = CO2)                                         

 

             GLUCOSE (test code = 100 mg/dL           N            



             GLU)                                                

 

             BLOOD UREA NITROGEN 21 mg/dL     7-18         H            



             (test code = BUN)                                        

 

             GLOMERULAR FILTRATION 79.0         >60                       Unit o

f measure:



             RATE (test code = GFR)                                        mL/mi

n/1.73



                                                                 [id number]



                                                                 Range:Healthy



                                                                 Adults         

 >90



                                                                 mL/min/1.73 m2 

For



                                                                 Chronic Kidney



                                                                 Disease:     St

age



                                                                 II     Mild



                                                                 Decrease in GFR



                                                                    60-90     St

age



                                                                 III    Moderate



                                                                 Decrease in GFR



                                                                 30-59     Stage

 IV



                                                                    Severe Decre

ase



                                                                 in GFR      15-

29



                                                                   Stage V



                                                                 Kidney Failure



                                                                          <15

 

             CREATININE (test code 0.92 mg/dL   0.55-1.30    N            



             = CREAT)                                            

 

             TOTAL PROTEIN (test 7.4 g/dL     6.4-8.2      N            



             code = PROT)                                        

 

             ALBUMIN (test code = 4.1 g/dL     3.4-5.0      N            



             ALB)                                                

 

             GLOBULIN (test code = 3.3 g/dL     2.2-4.2      N            



             GLOB)                                               

 

             ALBUMIN/GLOBULIN RATIO 1.2          0.7-2.0      N            



             (test code = A/G)                                        

 

             CALCIUM (test code = 9.2 mg/dL    8.2-10.1     N            



             CA)                                                 

 

             BILIRUBIN TOTAL (test 0.82 mg/dL   0.2-1.00     N            



             code = BILT)                                        

 

             SGOT/AST (test code = 25.0 U/L     15-37        N            



             AST)                                                

 

             SGPT/ALT (test code = 31.0 U/L     12-78        N            Please

 note new



             ALT)                                                normal range.

 

             ALKALINE PHOSPHATASE 67 U/L              N            



             TOTAL (test code =                                        



             ALKP)                                               



CBC W/AUTO [id number] 16:21:00





             Test Item    Value        Reference Range Interpretation Comments

 

             WHITE BLOOD CELL (test code = WBC) 4.6 K/mm3    5.7-10.5     L     

       

 

             RED BLOOD CELL (test code = RBC) 4.65 M/mm3   4.2-5.4      N       

     

 

             HEMOGLOBIN (test code = HGB) 14.9 g/dL    12-16        N           

 

 

             HEMATOCRIT (test code = HCT) 42.0 %       37-47        N           

 

 

             MEAN CELL VOLUME (test code = MCV) 90 fL        80-98        N     

       

 

             MEAN CELL HGB (test code = MCH) 32.0 pg      27-34        N        

    

 

             MEAN CELL HGB CONCENTRATION (test 35.5 g/dL    30.8-34.1    H      

      



             code = MCHC)                                        

 

             RED CELL DISTRIBUTION WIDTH (test 13.3 %       11-16        N      

      



             code = RDW)                                         

 

             PLT (test code = PLT) 165 K/mm3    130-400      N            

 

             MEAN PLATELET VOLUME (test code = 12.0 fL      8.9-12.1     N      

      



             MPV)                                                

 

             NEUTROPHIL % (test code = NT%) 64.3 %       45-70        N         

   

 

             LYMPHOCYTE % (test code = LY%) 25.1 %       20-40        N         

   

 

             MONOCYTE % (test code = MO%) 7.8 %        3-10         N           

 

 

             EOSINOPHIL % (test code = EO%) 1.5 %        1-5          N         

   

 

             BASOPHIL % (test code = BA%) 0.9 %        0.0-1.1      N           

 

 

             NEUTROPHIL # (test code = NT#) 2.95 K/mm3   2.00-7.50    N         

   

 

             LYMPHOCYTE # (test code = LY#) 1.15 K/mm3   1.50-4.00    L         

   

 

             MONOCYTE # (test code = MO#) 0.36 K/mm3   0.2-0.8      N           

 

 

             EOSINOPHIL # (test code = EO#) 0.07 K/mm3   0.04-0.4     N         

   

 

             BASOPHIL # (test code = BA#) 0.04 K/mm3   0.02-0.10    N           

 

 

             MANUAL DIFF REQUIRED (test code = NO           MANUAL DIFF         

      



             MDIFF)                                              

 

             NUCLEATED RED BLOOD CELL (test 0 %          0-0          N         

   



             code = NRBC)

## 2020-12-07 NOTE — EDPHYS
Physician Documentation                                                                           

 [facility]                                                                 

Name: Grupo Dumont                                                                              

Age: 80 yrs                                                                                       

Sex: Male                                                                                         

: 1940                                                                                   

MRN: [id number]                                                                                   

Arrival Date: 2020                                                                          

Time: 09:34                                                                                       

Account#: [id number]                                                                            

Bed 16                                                                                            

Private MD: Jere Aquino                                                                         

ED Physician Raciel Allen                                                                         

HPI:                                                                                              

                                                                                             

09:49 This 80 yrs old  Male presents to ER via [facility] with complaints of COVID+, kb  

      Breathing Difficulty.                                                                       

09:49 The patient or guardian reports cough, that is intermittent, described as moderate.     kb  

      Onset: The symptoms/episode began/occurred 2 week(s) ago. Severity of symptoms: At          

      their worst the symptoms were moderate, in the emergency department the symptoms are        

      unchanged. Modifying factors: The symptoms are alleviated by nothing, the symptoms are      

      aggravated by nothing. Associated signs and symptoms: The patient has no apparent           

      associated signs or symptoms. The patient has not experienced similar symptoms in the       

      past. The patient has not recently seen a physician. Wife reports pt tested positive        

      for COVID 2 weeks ago. They were able to get a pulse ox a couple of days ago and            

      noticed pt's oxygen was low. Today oxygen was reading 84% so they called Dr Aquino's        

      office and were told to come to the ER. States he has not had fever in a few days. .        

                                                                                                  

Historical:                                                                                       

- Allergies:                                                                                      

: NKA;                                                                                    tw2 

- Home Meds:                                                                                      

:58 carvedilol 12.5 mg oral tab 1 tab 2 times per day [Active];                             tw2 

      losartan-hydrochlorothiazide 50-12.5 mg oral tab 1 tab once daily [Active]; metformin       

      500 mg Oral tab 1 tab 2 times per day [Active]; allopurinol 300 mg Oral tab 1 tab once      

      daily [Active]; finasteride 1 mg oral tab 1 tab once daily [Active]; amitriptyline 10       

      mg Oral tab 1 tab 3 times per day [Active];                                                 

- PMHx:                                                                                           

:58 Hypertension;                                                                           tw2 

- PSHx:                                                                                           

:58 Hernia repair; Right knee sx; cataract sx;                                              tw2 

                                                                                                  

- Immunization history:: Adult Immunizations.                                                     

- Social history:: Smoking status: .                                                              

                                                                                                  

                                                                                                  

ROS:                                                                                              

09:49 Constitutional: Negative for fever, chills, and weight loss, Cardiovascular: Negative   kb  

      for chest pain, palpitations, and edema, Abdomen/GI: Negative for abdominal pain,           

      nausea, vomiting, diarrhea, and constipation, Back: Negative for injury and pain,           

      MS/Extremity: Negative for injury and deformity, Skin: Negative for injury, rash, and       

      discoloration, Neuro: Negative for headache, weakness, numbness, tingling, and seizure.     

09:49 Respiratory: Positive for cough, low oxygen.                                                

                                                                                                  

Exam:                                                                                             

09:48 Constitutional:  This is a well developed, well nourished patient who is awake, alert,  kb  

      and in no acute distress. Head/Face:  Normocephalic, atraumatic. Chest/axilla:  Normal      

      chest wall appearance and motion.  Nontender with no deformity.  No lesions are             

      appreciated. Cardiovascular:  Regular rate and rhythm with a normal S1 and S2.  No          

      gallops, murmurs, or rubs.  Normal PMI, no JVD.  No pulse deficits. Abdomen/GI:  Soft,      

      non-tender, with normal bowel sounds.  No distension or tympany.  No guarding or            

      rebound.  No evidence of tenderness throughout. Skin:  Warm, dry with normal turgor.        

      Normal color with no rashes, no lesions, and no evidence of cellulitis. MS/ Extremity:      

      Pulses equal, no cyanosis.  Neurovascular intact.  Full, normal range of motion. Neuro:     

       Awake and alert, GCS 15, oriented to person, place, time, and situation.  Cranial          

      nerves II-XII grossly intact.  Motor strength 5/5 in all extremities.  Sensory grossly      

      intact.  Cerebellar exam normal.  Normal gait.                                              

09:48 Respiratory: the patient does not display signs of respiratory distress,  Respirations:     

      normal.                                                                                     

                                                                                                  

Vital Signs:                                                                                      

09:38  / 67; Pulse 77; Resp 19; Temp 96.8(O); Pulse Ox 84% on R/A;                      tw2 

09:59 Weight 90.72 kg (R); Height 5 ft. 11 in. (180.34 cm);                                   tw2 

10:19  / 59; Pulse 74; Resp 22; Pulse Ox 92% on 2 lpm NC;                               tw2 

11:59  / 74; Pulse 82; Resp 22; Pulse Ox 92% on 2 lpm NC;                               tw2 

12:00  / 74; Pulse 83; Resp 22; Pulse Ox 91% on 2 lpm NC;                               tw2 

13:10  / 79; Pulse 82; Resp 24; Pulse Ox 93% on 2 lpm NC;                               tw2 

14:10  / 65; Pulse 84; Resp 17; Pulse Ox 94% on 2 lpm NC;                               tw2 

15:00  / 64; Pulse 71; Resp 19; Pulse Ox 93% on 2 lpm NC;                               tw2 

16:00  / 76; Pulse 81; Resp 22; Pulse Ox 95% on 2 lpm NC;                               tw2 

09:59 Body Mass Index 27.89 (90.72 kg, 180.34 cm)                                             tw2 

09:38 pt placed on o2 via 2 L nc at this time, will continue to monitor.                      tw2 

                                                                                                  

MDM:                                                                                              

09:36 Patient medically screened.                                                             kb  

09:48 Data reviewed: vital signs, nurses notes. Data interpreted: Pulse oximetry: on room air kb  

      is 80 %. Interpretation: hypoxia. Plan: O2 by NC applied.                                   

10:54 Counseling: I had a detailed discussion with the patient and/or guardian regarding: the kb  

      historical points, exam findings, and any diagnostic results supporting the                 

      discharge/admit diagnosis, lab results, radiology results, the need for further work-up     

      and treatment in the hospital.                                                              

12:22 Physician consultation: Jose De Jesus Hughes was contacted at 12:22, regarding admission, to     

      the telemetry unit. patient's condition, and will see patient in ED, shortly.               

                                                                                                  

12                                                                                             

09:40 Order name: Blood Culture Adult (2)                                                     kb  

                                                                                             

09:40 Order name: BMP; Complete Time: 11:07                                                   kb  

                                                                                             

09:40 Order name: C-Reactive Protein; Complete Time: 11:07                                    kb  

                                                                                             

09:40 Order name: CBC with Diff; Complete Time: 12:18                                         kb  

                                                                                             

09:40 Order name: D-Dimer; Complete Time: 11:02                                               kb  

                                                                                             

09:40 Order name: Ferritin; Complete Time: 11:07                                              kb  

                                                                                             

09:40 Order name: Flu; Complete Time: 10:50                                                   kb  

                                                                                             

09:40 Order name: Lactate; Complete Time: 10:50                                               kb  

                                                                                             

09:40 Order name: LFT's; Complete Time: 11:07                                                 kb  

                                                                                             

09:40 Order name: Lipase; Complete Time: 11:07                                                kb  

                                                                                             

09:40 Order name: Procalcitonin; Complete Time: 11:23                                         kb  

                                                                                             

09:40 Order name: PT-INR; Complete Time: 11:02                                                kb  

                                                                                             

09:40 Order name: Ptt, Activated; Complete Time: 11:02                                        kb  

                                                                                             

09:40 Order name: Troponin (emerg Dept Use Only); Complete Time: 11:07                        kb  

                                                                                             

10:55 Order name: CBC Smear Scan; Complete Time: 12:18                                        EDMS

                                                                                             

11:28 Order name: SARS-COV-2 RT PCR; Complete Time: 11:35                                     EDMS

                                                                                             

15:38 Order name: Urine Dipstick--Ancillary (enter results)                                   bd  

                                                                                             

15:49 Order name: Urine Dipstick-Ancillary; Complete Time: 15:50                              EDMS

                                                                                             

19:17 Order name: C-Reactive Protein; Complete Time: 22:26                                    EDMS

                                                                                             

19:19 Order name: Procalcitonin; Complete Time: 22:26                                         EDMS

                                                                                             

23:30 Order name: Potassium; Complete Time: 17:32                                             EDMS

                                                                                             

07:25 Order name: Urinalysis; Complete Time: 17:32                                            EDMS

                                                                                             

07:25 Order name: Urine Microscopic Only; Complete Time: 17:32                                EDMS

                                                                                             

07:25 Order name: CBC with Automated Diff; Complete Time: 17:32                               EDMS

                                                                                             

07:25 Order name: Basic Metabolic Panel; Complete Time: 17:32                                 EDMS

                                                                                             

07:25 Order name: Phosphorus; Complete Time: 17:32                                            EDMS

                                                                                             

07:25 Order name: C-Reactive Protein; Complete Time: 17:32                                    EDMS

                                                                                             

07:25 Order name: Magnesium; Complete Time: 17:32                                             EDMS

                                                                                             

04:20 Order name: Basic Metabolic Panel                                                       EDMS

                                                                                             

09:40 Order name: Droplet/Contact Precautions; Complete Time: 10:48                           kb  

                                                                                             

09:40 Order name: CXR XRAY; Complete Time: 11:09                                              kb  

                                                                                             

09:40 Order name: EKG; Complete Time: 09:42                                                   kb  

                                                                                             

09:40 Order name: Cardiac monitoring; Complete Time: 10:48                                    kb  

                                                                                             

09:40 Order name: EKG - Nurse/Tech; Complete Time: 10:48                                      kb  

                                                                                             

09:40 Order name: IV Start; Complete Time: 10:49                                              kb  

                                                                                             

09:40 Order name: Labs collected and sent; Complete Time: 10:49                               kb  

                                                                                             

09:40 Order name: O2 Per Protocol; Complete Time: 10:49                                       kb  

                                                                                             

09:40 Order name: O2 Sat Monitoring; Complete Time: 10:49                                     kb  

                                                                                             

09:40 Order name: Urine Dipstick-Ancillary (obtain specimen); Complete Time: 15:45            kb  

                                                                                             

11:02 Order name: CT Chest For PE Angio; Complete Time: 12:18                                 kb  

                                                                                             

04:20 Order name: Magnesium                                                                   EDMS

                                                                                             

04:23 Order name: C-Reactive Protein                                                          EDMS

                                                                                             

04:30 Order name: CBC with Automated Diff                                                     EDMS

                                                                                                  

Administered Medications:                                                                         

10:16 Drug: Decadron - Dexamethasone 10 mg Route: IVP; Site: left antecubital;                tw2 

10:56 Follow up: Response: No adverse reaction                                                tw2 

12:21 Drug: NS 0.9% with KCl 40 mEq/L 1000 ml {Note: iv fluids available from pharmacy at     tw2 

      this time..} Route: IV; Rate: 125 ml/hr; Site: left antecubital;                            

16:41 Follow up: IV Status: Infusion continued upon admission                                 tw2 

16:41 Follow up: IV Status: Infusion continued upon admission                                 tw2 

                                                                                                  

                                                                                                  

Disposition:                                                                                      

                                                                                             

19:45 Co-signature as Attending Physician, Raciel Allen MD.                                    rn  

                                                                                                  

Disposition:                                                                                      

20 12:22 Hospitalization ordered by Jose De Jesus Hughes for Inpatient Admission. Preliminary     

  diagnosis are Coronavirus infection, unspecified - COVID-19, Hypoxia,                           

  Hypokalemia.                                                                                    

- Bed requested for [facility] ER HOLD.                                                                 

- Status is Inpatient Admission.                                                              em  

- Condition is Stable.                                                                            

- Problem is new.                                                                                 

- Symptoms are unchanged.                                                                         

                                                                                                  

                                                                                                  

                                                                                                  

Signatures:                                                                                       

Dispatcher [employer]                           EDMS                                                 

Jeanine Scanlon, FNP-C                 FNP-Ckb                                                   

Daniel Degroot MD MD cha Munoz, Edgar, RN                        RN   em                                                   

Quin Aguilera RN RN                                                      

Raciel Allen MD MD rn Wise, Tara, RN                          RN   tw2                                                  

                                                                                                  

Corrections: (The following items were deleted from the chart)                                    

                                                                                             

10:30 09:42 CORONAVIRUS+MR.LAB.BRZ ordered. [facility]                                              EDMS

14:21 12:22 Hospitalization Ordered by Jose De Jesus Hughes for Inpatient Admission. Preliminary       

      diagnosis is Coronavirus infection, unspecified - COVID-19; Hypoxia; Hypokalemia. Bed       

      requested for Telemetry/MedSurg (Inpatient). Status is Inpatient Admission. Condition       

      is Stable. Problem is new. Symptoms are unchanged. kb                                       

                                                                                             

16:58  14:21 2020 12:22 Hospitalization Ordered by Jose De Jesus Hughes for Inpatient     em  

      Admission. Preliminary diagnosis is Coronavirus infection, unspecified - COVID-19;          

      Hypoxia; Hypokalemia. Bed requested for [facility] ER HOLD. Status is Inpatient Admission.        

      Condition is Stable. Problem is new. Symptoms are unchanged. iw                             

                                                                                                  

**************************************************************************************************

## 2020-12-07 NOTE — ER
Nurse's Notes                                                                                     

 [facility]                                                                 

Name: Grupo Dumont                                                                              

Age: 80 yrs                                                                                       

Sex: Male                                                                                         

: 1940                                                                                   

MRN: [id number]                                                                                   

Arrival Date: 2020                                                                          

Time: 09:34                                                                                       

Account#: [id number]                                                                            

Bed 16                                                                                            

Private MD: Jere Aquino                                                                         

Diagnosis: Coronavirus infection, unspecified-COVID-19;Hypoxia;Hypokalemia                        

                                                                                                  

Presentation:                                                                                     

                                                                                             

09:38 Chief complaint: Spouse and/or significant other states: his o2 levels are low, he      tw2 

      tested POSITIVE for Covid the Tuesday or Wednesday before , we finally got      

      a pulse ox and today it was 84% and Dr. San office said to come to the ER, he does       

      have a COUGH, but no fever x 1 week. Coronavirus screen: cough unrelated to allergies,      

      Client presents with at least one sign or symptom that may indicate coronavirus-19.         

      Standard/surgical mask placed on the client. Provider contacted for isolation               

      considerations. Client reports previous positive COVID test result.  or         

      . Ebola Screen: Patient denies travel to an Ebola-affected        

      area in the 21 days before illness onset. Initial Sepsis Screen: Does the patient meet      

      any 2 criteria? No. Patient's initial sepsis screen is negative. Does the patient have      

      a suspected source of infection? Yes: Productive cough/pneumonia. Risk Assessment: Do       

      you want to hurt yourself or someone else? Patient reports no desire to harm self or        

      others. Onset of symptoms was 2020.                                            

09:38 Method Of Arrival: Ambulatory                                                           tw2 

09:38 Acuity: ANDRES 3                                                                           tw2 

                                                                                                  

Triage Assessment:                                                                                

09:56 General: Appears in no apparent distress. well groomed, Behavior is calm, cooperative,  tw2 

      appropriate for age. Pain: Denies pain. Respiratory: Reports cough that is                  

      non-productive, Onset: The symptoms/episode began/occurred for a week now, the patient      

      has mild shortness of breath.                                                               

                                                                                                  

Historical:                                                                                       

- Allergies:                                                                                      

09:58 NKA;                                                                                    tw2 

- Home Meds:                                                                                      

09:58 carvedilol 12.5 mg oral tab 1 tab 2 times per day [Active];                             tw2 

      losartan-hydrochlorothiazide 50-12.5 mg oral tab 1 tab once daily [Active]; metformin       

      500 mg Oral tab 1 tab 2 times per day [Active]; allopurinol 300 mg Oral tab 1 tab once      

      daily [Active]; finasteride 1 mg oral tab 1 tab once daily [Active]; amitriptyline 10       

      mg Oral tab 1 tab 3 times per day [Active];                                                 

- PMHx:                                                                                           

09:58 Hypertension;                                                                           tw2 

- PSHx:                                                                                           

09:58 Hernia repair; Right knee sx; cataract sx;                                              tw2 

                                                                                                  

- Immunization history:: Adult Immunizations.                                                     

- Social history:: Smoking status: .                                                              

                                                                                                  

                                                                                                  

Screening:                                                                                        

10:57 Abuse screen: Denies threats or abuse. Nutritional screening: No deficits noted.        tw2 

      Tuberculosis screening: No symptoms or risk factors identified. Fall Risk None              

      identified.                                                                                 

                                                                                                  

Assessment:                                                                                       

10:25 General: Appears in no apparent distress. well groomed, Behavior is calm, cooperative,  tw2 

      appropriate for age. Pain: Denies pain. Neuro: Level of Consciousness is awake, alert,      

      obeys commands, Oriented to person, place, time, situation. Cardiovascular: Rhythm is       

      regular. Respiratory: Reports cough that is non-productive, Airway is patent                

      Respiratory effort is even, unlabored, Respiratory pattern is regular, symmetrical,         

      Breath sounds are diminished bilaterally. GI: Abdomen is round non-distended, Bowel         

      sounds present X 4 quads. : No signs and/or symptoms were reported regarding the          

      genitourinary system. EENT: No signs and/or symptoms were reported regarding the EENT       

      system. Derm: No signs and/or symptoms reported regarding the dermatologic system.          

      Musculoskeletal: Range of motion: intact in all extremities.                                

11:59 Reassessment: Patient appears in no apparent distress at this time. No changes from     tw2 

      previously documented assessment. Patient and/or family updated on plan of care and         

      expected duration. Pain level reassessed. Patient is alert, oriented x 3, equal             

      unlabored respirations, skin warm/dry/pink.                                                 

13:10 Reassessment: Patient appears in no apparent distress at this time. No changes from     tw2 

      previously documented assessment. Patient and/or family updated on plan of care and         

      expected duration. Pain level reassessed. Patient is alert, oriented x 3, equal             

      unlabored respirations, skin warm/dry/pink.                                                 

                                                                                                  

Vital Signs:                                                                                      

09:38  / 67; Pulse 77; Resp 19; Temp 96.8(O); Pulse Ox 84% on R/A;                      tw2 

09:59 Weight 90.72 kg (R); Height 5 ft. 11 in. (180.34 cm);                                   tw2 

10:19  / 59; Pulse 74; Resp 22; Pulse Ox 92% on 2 lpm NC;                               tw2 

11:59  / 74; Pulse 82; Resp 22; Pulse Ox 92% on 2 lpm NC;                               tw2 

12:00  / 74; Pulse 83; Resp 22; Pulse Ox 91% on 2 lpm NC;                               tw2 

13:10  / 79; Pulse 82; Resp 24; Pulse Ox 93% on 2 lpm NC;                               tw2 

14:10  / 65; Pulse 84; Resp 17; Pulse Ox 94% on 2 lpm NC;                               tw2 

15:00  / 64; Pulse 71; Resp 19; Pulse Ox 93% on 2 lpm NC;                               tw2 

16:00  / 76; Pulse 81; Resp 22; Pulse Ox 95% on 2 lpm NC;                               tw2 

09:59 Body Mass Index 27.89 (90.72 kg, 180.34 cm)                                             tw2 

09:38 pt placed on o2 via 2 L nc at this time, will continue to monitor.                      tw2 

                                                                                                  

ED Course:                                                                                        

09:34 Patient arrived in ED.                                                                  ag5 

09:34 Jere Aquino MD is Private Physician.                                                 ag5 

09:34 [facility]                                                        kb  

09:34 Raciel Allen MD is Attending Physician.                                                kb  

09:42 Sandra Higgins, RN is Primary Nurse.                                                        tw2 

09:56 Triage completed.                                                                       tw2 

09:56 Arm band placed on.                                                                     tw2 

09:56 Placed in gown. Bed in low position. Cardiac monitor on. Pulse ox on. NIBP on.          tw2 

10:13 Inserted saline lock: 22 gauge in left antecubital area, using aseptic technique.       tw2 

      ,using aseptic technique. by [employer] Blood collected.                                       

10:41 CXR XRAY In Process Unspecified.                                                        EDMS

12:09 CT Chest For PE Angio In Process Unspecified.                                           EDMS

12:21 Jose De Jesus Hughes is Hospitalizing Provider.                                               kb  

16:53 No provider procedures requiring assistance completed. Patient admitted, IV remains in  tw2 

      place.                                                                                      

                                                                                             

06:38 Primary Nurse role handed off by Sandra Higgins, RN                                         sg  

                                                                                                  

Administered Medications:                                                                         

                                                                                             

10:16 Drug: Decadron - Dexamethasone 10 mg Route: IVP; Site: left antecubital;                tw2 

10:56 Follow up: Response: No adverse reaction                                                tw2 

12:21 Drug: NS 0.9% with KCl 40 mEq/L 1000 ml {Note: iv fluids available from pharmacy at     tw2 

      this time..} Route: IV; Rate: 125 ml/hr; Site: left antecubital;                            

16:41 Follow up: IV Status: Infusion continued upon admission                                 tw2 

16:41 Follow up: IV Status: Infusion continued upon admission                                 tw2 

                                                                                                  

                                                                                                  

Outcome:                                                                                          

12:22 Decision to Hospitalize by Provider.                                                    kb  

16:53 Admitted to ER Hold.  Please see [facility] for further documentation.                    tw2 

16:53 Condition: stable                                                                           

16:53 Instructed on the need for admit.                                                           

                                                                                             

16:58 Patient left the ED.                                                                    em  

                                                                                                  

Signatures:                                                                                       

Dispatcher [employer]                           Jeanine Kovacs, VALENTIN-C                 FNP-Marcial Gallardo RN                         JOSE J                                                      

Oscar Ferrari RN RN                                                      

Sandra Higgins RN RN   tw2                                                  

Shaheed Duke                                5                                                  

                                                                                                  

**************************************************************************************************
Mela

## 2020-12-07 NOTE — P.HP
Certification for Inpatient


Patient admitted to: Inpatient


With expected LOS: >2 Midnights


Practitioner: I am a practitioner with admitting privileges, knowledge of 

patient current condition, hospital course, and medical plan of care.


Services: Services provided to patient in accordance with Admission requirements

found in Title 42 Section 412.3 of the Code of Federal Regulations





Patient History


Date of Service: 12/07/20


Reason for admission: Shortness of breath


History of Present Illness: 


80-year-old gentleman with a history of hypertension and borderline diabetes 

presented emergency department with a complaint of progressive shortness of 

breath.  He tested positive for COVID 19 2 weeks ago and since then had been 

experienced malaise and fever.  He developed shortness of breath a few days ago 

which became rapidly progressive.  He was hypoxic in the emergency department 

and patient was requiring 2-4 L of oxygen to maintain SaO2 of 90%.  CTA thorax 

shows no pulmonary embolism demonstrated significant bilateral infiltrates 

suggestive of COVID pneumonia.  Patient also hypokalemia with potassium 2.9.  He

is admitted for further management.





Allergies





No Known Allergies Allergy (Verified 08/23/18 14:32)


   





Home Medications: 








allopurinoL [Zyloprim*] 300 mg PO DAILY 09/09/15 








- Past Medical/Surgical History


-: Hypertension


-: Glucose intolerance





- Family History


Family History: Reviewed- Non-Contributory





- Family History


  ** Father


-: Heart disease (At advanced age.)





- Social History


Smoking Status: Never smoker


Alcohol use: Yes


CD- Drugs: No





Review of Systems


Other: 


Except as documented, all other systems reviewed and negative.








Physical Examination





- Physical Exam


General: Alert, In no apparent distress


HEENT: Atraumatic, Normocephalic


Neck: Supple, JVD not distended


Respiratory: Normal air movement, Crackles/rales


Cardiovascular: No edema, Regular rate/rhythm, Normal S1 S2


Gastrointestinal: Soft and benign, Non-distended


Musculoskeletal: No clubbing, No swelling


Integumentary: No rashes, No erythema


Neurological: Normal speech, Normal strength at 5/5 x4 extr, Cranial nerves 3-12

 intact





- Studies


Laboratory Data (last 24 hrs)





12/07/20 10:13: PT 12.5, INR 1.06, APTT 24.0 L


12/07/20 10:13: WBC 4.8, Hgb 13.2 L, Hct 37.2 L, Plt Count 296


12/07/20 10:13: Sodium 134 L, Potassium 2.9 L*, BUN 21 H, Creatinine 0.98, 

Glucose 144 H, Total Bilirubin 0.7, AST 93 H,  H, Alkaline Phosphatase 

74, Lipase 250





Microbiology Data (last 24 hrs): 








12/07/20 10:07   Nasopharnyx   Influenza Type A Antigen Screen - Final


12/07/20 10:07   Nasopharnyx   Influenza Type B Antigen Screen - Final








Assessment and Plan





- Problems (Diagnosis)


(1) Pneumonia due to COVID-19 virus


Current Visit: Yes   Status: Acute   





(2) Acute respiratory failure with hypoxia


Current Visit: Yes   Status: Acute   





(3) Hypokalemia


Current Visit: Yes   Status: Acute   





(4) Hypertension


Current Visit: Yes   Status: Acute   





- Plan


Admit to the medical floor.


Start IV Solu-Medrol.


Start Eliquis for thromboembolism prophylaxis.


Titrate oxygen


Vitamin supplementation-vitamin C and D


Zinc supplementation.


Consult to pulmonary-Dr. Mckeon.





- Advance Directives


Does patient have a Living Will: No


Does patient have a Durable POA for Healthcare: No

## 2020-12-07 NOTE — RAD REPORT
EXAM DESCRIPTION:  CT - Chest For Pe Angio - 12/7/2020 11:52 am

 

CLINICAL HISTORY:  Chest pain.

Congestion;Cough

 

COMPARISON:  No comparisons

 

TECHNIQUE:  CT angiogram of the pulmonary arteries was performed with MIP.

 

All CT scans are performed using dose optimization technique as appropriate and may include automated
 exposure control or mA/KV adjustment according to patient size.

 

FINDINGS:  No evidence of pulmonary thromboembolism.

 

No acute aortic finding demonstrated.

 

Extensive alveolar and interstitial lung opacities are present bilaterally suggesting COVID-19 infect
ion.

 

No significant pericardial or pleural fluid.

 

No concerning bony finding.

 

IMPRESSION:  No evidence of pulmonary thromboembolism.

 

Extensive alveolar and interstitial lung opacities are present compatible with COVID-19 infection.

## 2020-12-07 NOTE — XMS REPORT
Summary of Care

                          Created on:2020



Patient:Grupo Dumont

Sex:Male

:1940

External Reference #:[id number]





Demographics







                          Address                   [address] [place] [address] 



                                                    [place] [address]

 

                          Home Phone                [phone]

 

                          Email Address             [email or web address]

 

                          Preferred Language        English

 

                          Marital Status            

 

                          Alevism Affiliation     Unknown

 

                          Race                      White

 

                          Ethnic Group              Not  or 









Author







                          Organization              [facility]

 

                          Address                   [address] [place] [id number]









Support







                Name            Relationship    Address         Phone

 

                Beata Dumont Unavailable     Unavailable     [phone]

[phone]

 

                Cesar Dumont Unavailable     Unavailable     Unavailable









Care Team Providers







                    Name                Role                Phone

 

                    Pcp,  Does Not Have A Primary Care Provider [phone]









Reason for Visit







                          Reason                    Comments

 

                          LAB                       







Encounter Details







             Date         Type         Department   Care Team    Description

 

                2020      Laboratory Only [facility] Clara Edouard PA



[address] [place] [address] [place] [phone] (Fax)                      Exposure to



                                                            Medicine - [place]



                                        



Lab, Adc Fam Pob I                      SARS-associated



                                       [address] [place] [address]              coronaviru

s (Primary



                                                            Drive



                                                    Dx)



                                       [place], [place]              



                                                            [id number] [phone]              







Allergies

No Known Allergiesdocumented as of this encounter (statuses as of 2020)



Medications







          Medication Sig       Dispensed Refills   Start Date End Date  Status

 

          allopurinoL 300 mg tablet                     0         09/10/2020    

       Active

 

          amitriptyline 10 mg tablet                     0         10/30/2020   

        Active

 

          budesonide 0.5 mg/2 mL nebulizer                     0         

020           Active



          solution                                                    

 

          carvediloL 12.5 mg tablet                     0         09/10/2020    

       Active

 

          losartan-hydrochlorothiazide 50-12.5                     0                    Active



          mg per tablet                                                   

 

          metFORMIN 500 mg tablet                     0         09/10/2020      

     Active



documented as of this encounter (statuses as of 2020)



Active Problems

Not on filedocumented as of this encounter (statuses as of 2020)



Social History







             Tobacco Use  Types        Packs/Day    Years Used   Date

 

             Never Smoker                                        









                Smokeless Tobacco: Never Used                                 









                          Sex Assigned at Birth     Date Recorded

 

                          Not on file               









                    COVID-19 Exposure   Response            Date Recorded

 

                    In the last month, have you been in contact with No / Unsure

         2020  2:26 PM

CST



                    someone who was confirmed or suspected to have              

       



                    Coronavirus / COVID-19?                     



documented as of this encounter



Last Filed Vital Signs

Not on filedocumented in this encounter



Nursing Notes

Nevaeh Villa RN - 2020  2:40 PM CSTGrupo Dumont is a 80 year old 
male here for COVID Screening with a Nasopharyngeal Swab



All droplet and contact precautions taken with appropriate PPE worn while 
interacting with patient.

? Goggles

? N95 Mask

? Gloves

? Gown



RR 16 Pulse Ox 97%



Patient educated on plan of care for visit, swabbing technique, risks and 
benefits of test and length of time to receive results. Verbal consent obtained 
to perform test. CDC Fact Sheet for Patients nCoV Diagnostic Panel dated 
3/15/2020 and Factsheet What to Do if Sick with COVID 19 20 provided.



Patient swabbed per appropriate nasopharyngeal technique, and patient tolerated 
well. Patient was discharged from the testing clinic in stable condition.



Nevaeh Villa RN  2020  2:34 PM

Electronically signed by Nevaeh Villa RN at 2020  2:35 PM CSTdocumented
in this encounter



Plan of Treatment







             Name         Type         Priority     Associated Diagnoses Order S

chedule

 

             COVID-19 (MOLECULAR LAB          Routine      Exposure to  Expected

: 2020,



                                        TESTING 



                                                SARS-associated Expires: 

021



              NUCLEIC ACID                           coronavirus  



             AMPLIFICATION)                                        









                Health Maintenance Due Date        Last Done       Comments

 

                Depression Screening 1950                      

 

                DTaP,Tdap,and Td Vaccines (1 - Tdap) 1957                 

     

 

                Zoster Recombinant Vaccine (SHINGRIX) (1 of 2) 1988       

               

 

                Medicare Wellness Visit 2003                      

 

                PNEUMOCOCCAL VACCINES 65+ (1 of 1 - PPSV23) 2003          

            

 

                INFLUENZA VACCINE (#1) 2020                      



documented as of this encounter



Results

Not on filedocumented in this encounter



Visit Diagnoses







                                        Diagnosis

 

                                        Exposure to SARS-associated coronavirus 

- Primary



documented in this encounter



Additional Health Concerns







                Infection       Onset Date      Last Indicated  Resolved Time

 

                COVID-19 Rule Out 2020      



documented as of this encounter



Insurance







          Payer     Benefit Plan / Subscriber ID Effective Phone     Address   T

ype



                    Group               Dates                         

 

          [employer] [id number] 2020-Prese                    

 Medicare Adv



          HEALTHCARE COMPLETE            nt                            PPO



          MEDICARE  CHOICE                                            



          ADVANTAGE                                                   









           Guarantor Name Account Type Relation to Date of    Phone      Billing



                                 Patient    Birth                 Address

 

           Grupo Dumont KAYLI Personal/Family Self       1940 [phone]

[address]







                                                       (Home)     [place] [address]



documented as of this encounter

## 2020-12-08 LAB
BUN BLD-MCNC: 19 MG/DL (ref 7–18)
CRP SERPL-MCNC: 124 MG/L (ref ?–3)
GLUCOSE SERPLBLD-MCNC: 162 MG/DL (ref 74–106)
HCT VFR BLD CALC: 34.2 % (ref 39.6–49)
LYMPHOCYTES # SPEC AUTO: 0.4 K/UL (ref 0.7–4.9)
MAGNESIUM SERPL-MCNC: 2.1 MG/DL (ref 1.8–2.4)
PMV BLD: 7.2 FL (ref 7.6–11.3)
POTASSIUM SERPL-SCNC: 3.7 MMOL/L (ref 3.5–5.1)
RBC # BLD: 3.76 M/UL (ref 4.33–5.43)
UA DIPSTICK W REFLEX MICRO PNL UR: (no result)

## 2020-12-08 RX ADMIN — METHYLPREDNISOLONE SODIUM SUCCINATE SCH MG: 125 INJECTION, POWDER, FOR SOLUTION INTRAMUSCULAR; INTRAVENOUS at 17:00

## 2020-12-08 RX ADMIN — CHOLECALCIFEROL TAB 25 MCG (1000 UNIT) SCH UNIT: 25 TAB at 09:00

## 2020-12-08 RX ADMIN — Medication SCH ML: at 20:45

## 2020-12-08 RX ADMIN — FUROSEMIDE SCH MG: 10 INJECTION, SOLUTION INTRAVENOUS at 09:00

## 2020-12-08 RX ADMIN — ZINC SULFATE CAP 220 MG (50 MG ELEMENTAL ZN) SCH MG: 220 (50 ZN) CAP at 09:00

## 2020-12-08 RX ADMIN — POTASSIUM CHLORIDE SCH MEQ: 10 TABLET, FILM COATED, EXTENDED RELEASE ORAL at 09:00

## 2020-12-08 RX ADMIN — Medication SCH MG: at 09:00

## 2020-12-08 RX ADMIN — LOSARTAN POTASSIUM SCH MG: 50 TABLET, FILM COATED ORAL at 12:51

## 2020-12-08 RX ADMIN — SODIUM CHLORIDE SCH MLS: 0.9 INJECTION, SOLUTION INTRAVENOUS at 01:56

## 2020-12-08 RX ADMIN — POTASSIUM CHLORIDE SCH MEQ: 10 TABLET, FILM COATED, EXTENDED RELEASE ORAL at 06:28

## 2020-12-08 RX ADMIN — Medication SCH MG: at 20:45

## 2020-12-08 RX ADMIN — APIXABAN SCH MG: 5 TABLET, FILM COATED ORAL at 20:45

## 2020-12-08 RX ADMIN — METHYLPREDNISOLONE SODIUM SUCCINATE SCH MG: 40 INJECTION, POWDER, FOR SOLUTION INTRAMUSCULAR; INTRAVENOUS at 09:00

## 2020-12-08 RX ADMIN — Medication SCH ML: at 09:00

## 2020-12-08 RX ADMIN — APIXABAN SCH MG: 5 TABLET, FILM COATED ORAL at 09:00

## 2020-12-08 NOTE — P.CNS
Date of Consult: 12/08/20


Reason for Consult: Pneumonia due to corona virus


Chief Complaint: Shortness of breath


History of Present Illness: 


Patient is 80 years of age [place] history of hypertension diabetes admitted with 

progressive shortness of breath he was tested positive for corona virus 2 weeks 

ago was with experiencing some fever malaise the scan is very characteristic





Allergies





No Known Allergies Allergy (Verified 08/23/18 14:32)


   





Home Medications: 








allopurinoL [Zyloprim*] 300 mg PO DAILY 09/09/15 


Amitriptyline [Elavil] 10 mg PO BEDTIME 12/07/20 


Carvedilol [Coreg] 1 tab PO BID 12/07/20 


Finasteride 1 tab PO DAILY 12/07/20 


Losartan/Hydrochlorothiazide [Losartan-Hctz 50-12.5 mg Tab] 50 tab DAILY 

12/07/20 


Metformin ER [Glucophage ER*] 1 tab PO BID 12/07/20 








- Past Medical/Surgical History


-: Hypertension


-: Glucose intolerance





- Family History


  ** Father


Medical History: Heart disease (At advanced age.)





- Social History


Alcohol use: Yes


CD- Drugs: No





Review of Systems


General: Weakness


Respiratory: Cough, Shortness of Breath





Physical Examination














Temp Pulse Resp BP Pulse Ox


 


 97.8 F   89   18   161/86 H  92 


 


 12/08/20 08:00  12/08/20 09:00  12/08/20 08:00  12/08/20 09:00  12/08/20 08:00











- Problems


(1) Pneumonia due to COVID-19 virus


Current Visit: Yes   Status: Acute   


Plan: 


Patient is 80 years of age admitted with the corona virus pneumonia in CRP is 

declining in you with steroids and anticoagulation he is currently on nasal 

cannula oxygen continue to monitor for 1 more day possible discharge tomorrow if

he does not progress on steroids laboratory data all reviewed I have also added 

low-dose Lasix

## 2020-12-08 NOTE — P.PN
Subjective


Date of Service: 12/08/20


Chief Complaint: Shortness of breath


Subjective: Improving (feeling better this morning, however desaturated to 80% 

on 4LNC while standing up at bedside)





Review of Systems


10-point ROS is otherwise unremarkable





Physical Examination





- Vital Signs


Temperature: 97.8 F


Blood Pressure: 144/73


Pulse: 64


Respirations: 17


Pulse Ox (%): 93





- Physical Exam


General: Alert, In no apparent distress


HEENT: Sclerae nonicteric


Respiratory: Other (slightly labored respirations on 4 LNC)


Cardiovascular: No edema, Regular rate/rhythm


Gastrointestinal: Soft and benign, No tenderness


Musculoskeletal: No tenderness


Integumentary: No rashes


Neurological: Normal speech, Normal affect





Assessment & Plan


Physician Review Additional Text: 


 


Acute respiratory failure with Hypoxia due to Pneumonia due to COVID-19 virus


Hypokalemia


HTN





continue IV Solumedrol, eliquis for VTE prophylaxis


continue Vit C/D, zinc


titrate oxygen as needed


consulted pulm - appreciate assistance


desaturated to 80% on 4LNC


trend CRP





Dispo: anticipate dc home in 24-48 hrs, needs to be stable on 3-4 LNC





Time Spent Managing Pts Care (In Minutes): 35

## 2020-12-09 VITALS — DIASTOLIC BLOOD PRESSURE: 69 MMHG | TEMPERATURE: 97.6 F | OXYGEN SATURATION: 93 % | SYSTOLIC BLOOD PRESSURE: 144 MMHG

## 2020-12-09 LAB
BUN BLD-MCNC: 27 MG/DL (ref 7–18)
GLUCOSE SERPLBLD-MCNC: 175 MG/DL (ref 74–106)
HCT VFR BLD CALC: 37.1 % (ref 39.6–49)
LYMPHOCYTES # SPEC AUTO: 0.4 K/UL (ref 0.7–4.9)
MAGNESIUM SERPL-MCNC: 2 MG/DL (ref 1.8–2.4)
PMV BLD: 7.1 FL (ref 7.6–11.3)
POTASSIUM SERPL-SCNC: 3.6 MMOL/L (ref 3.5–5.1)
RBC # BLD: 4.1 M/UL (ref 4.33–5.43)

## 2020-12-09 RX ADMIN — POTASSIUM CHLORIDE SCH MEQ: 10 TABLET, FILM COATED, EXTENDED RELEASE ORAL at 09:00

## 2020-12-09 RX ADMIN — ZINC SULFATE CAP 220 MG (50 MG ELEMENTAL ZN) SCH MG: 220 (50 ZN) CAP at 09:00

## 2020-12-09 RX ADMIN — FUROSEMIDE SCH MG: 10 INJECTION, SOLUTION INTRAVENOUS at 09:00

## 2020-12-09 RX ADMIN — Medication SCH MG: at 09:00

## 2020-12-09 RX ADMIN — METHYLPREDNISOLONE SODIUM SUCCINATE SCH MG: 125 INJECTION, POWDER, FOR SOLUTION INTRAMUSCULAR; INTRAVENOUS at 09:00

## 2020-12-09 RX ADMIN — CHOLECALCIFEROL TAB 25 MCG (1000 UNIT) SCH UNIT: 25 TAB at 09:00

## 2020-12-09 RX ADMIN — Medication SCH: at 09:00

## 2020-12-09 RX ADMIN — METHYLPREDNISOLONE SODIUM SUCCINATE SCH MG: 125 INJECTION, POWDER, FOR SOLUTION INTRAMUSCULAR; INTRAVENOUS at 01:25

## 2020-12-09 RX ADMIN — Medication SCH ML: at 09:00

## 2020-12-09 RX ADMIN — APIXABAN SCH MG: 5 TABLET, FILM COATED ORAL at 09:00

## 2020-12-09 RX ADMIN — LOSARTAN POTASSIUM SCH: 50 TABLET, FILM COATED ORAL at 09:00

## 2020-12-09 RX ADMIN — METHYLPREDNISOLONE SODIUM SUCCINATE SCH: 125 INJECTION, POWDER, FOR SOLUTION INTRAMUSCULAR; INTRAVENOUS at 16:57

## 2020-12-09 NOTE — P.PN
Subjective


Date of Service: 12/09/20


Chief Complaint: Respiratory failure from corona virus


Subjective: Improving (Patient is doing much better and to manage on 4 L of 

FiO2)





Patient is doing much better feeling better little short of breath no cough





Review of Systems


General: Weakness


Respiratory: Shortness of Breath





Physical Examination





- Vital Signs


Temperature: 97.6 F


Blood Pressure: 144/69


Pulse: 63


Respirations: 20


Pulse Ox (%): 93





- Physical Exam


General: Alert, In no apparent distress, Oriented x3, Cooperative


Neck: Supple


Respiratory: Clear to auscultation bilaterally


Cardiovascular: No edema, Normal S1 S2





Assessment & Plan





- Problems (Diagnosis)


(1) Pneumonia due to COVID-19 virus


Current Visit: Yes   Status: Acute   


Plan: 


Patient is with respiratory failure from corona virus is currently doing much 

better to discharge home on oxygen 4 L a min labs reviewed CRP is declined 

continue with steroids follow-up with me next week

## 2020-12-09 NOTE — P.DS
Admission Date: 12/07/20


Discharge Date: 12/09/20


Disposition: ROUTINE DISCHARGE


Discharge Condition: FAIR


Reason for Admission: Respiratory failure from corona virus


Consultations: 


Pulm - Dr. Mckeon





Procedures: 


CXR (12/7): Extensive bilateral pulmonary opacities are present most compatible 

with viral pneumonia


CTA Chest (12/7): No evidence of pulmonary thromboembolism. Extensive alveolar 

and interstitial lung opacities are present compatible with COVID-19 infection.





Problem List:


Acute respiratory failure with hypoxia due to COVID-19 pneumonia


Hypokalemia


HTN





Brief History of Present Illness: 


79yo male, PMH: HTN, borderline diabetes, presented to ED due to progressive SOB

and found to have COVID-19 pneumonia.  He tested positive for COVID 19 2 weeks 

ago and since then had been experienced malaise and fever.  He developed 

shortness of breath a few days ago which became rapidly progressive.  He was 

hypoxic in the emergency department and patient was requiring 2-4 L of oxygen to

maintain SaO2 of 90%.  CTA thorax shows no pulmonary embolism demonstrated 

significant bilateral infiltrates suggestive of COVID pneumonia.  Patient also 

hypokalemia with potassium 2.9.





Hospital Course: 


He was treated with IV solumedrol 80 mg q8hr with improvement of his CRP (166 ->

71) and symptoms. Pulmonology was consulted and felt patient was appropriate to 

be discharged home safely. On day of discharge patient was requiring 3-4L NC 

with SpO2 ~90-92%. He reported feeling much better and wanting to be discharged 

home. He was discharged with 2 week tapering course of prednisone, home oxygen, 

and eliquis. He is to f/u with Dr. Mckeon in the next ~1 week.





Vital Signs/Physical Exam: 














Temp Pulse Resp BP Pulse Ox


 


 97.6 F   63   20   144/69 H  93 


 


 12/09/20 12:31  12/09/20 12:31  12/09/20 12:31  12/09/20 12:31  12/09/20 12:31








General: Alert, In no apparent distress


HEENT: Sclerae nonicteric


Respiratory: Other (non-labored on 3.5L NC while sitting in bed)


Cardiovascular: No edema, Regular rate/rhythm


Gastrointestinal: Soft and benign, No tenderness


Musculoskeletal: No tenderness


Integumentary: No rashes


Neurological: Normal speech, Normal affect


Laboratory Data at Discharge: 














WBC  7.7 K/uL (4.3-10.9)  D 12/09/20  03:33    


 


Hgb  13.2 g/dL (13.6-17.9)  L  12/09/20  03:33    


 


Hct  37.1 % (39.6-49.0)  L  12/09/20  03:33    


 


Plt Count  391 K/uL (152-406)  D 12/09/20  03:33    


 


PT  12.5 SECONDS (9.5-12.5)   12/07/20  10:13    


 


INR  1.06   12/07/20  10:13    


 


APTT  24.0 SECONDS (24.3-36.9)  L  12/07/20  10:13    


 


Sodium  141 mmol/L (136-145)   12/09/20  03:33    


 


Potassium  3.6 mmol/L (3.5-5.1)   12/09/20  03:33    


 


BUN  27 mg/dL (7-18)  H  12/09/20  03:33    


 


Creatinine  0.91 mg/dL (0.55-1.3)   12/09/20  03:33    


 


Glucose  175 mg/dL ()  H  12/09/20  03:33    


 


Phosphorus  2.7 mg/dL (2.5-4.9)   12/08/20  03:15    


 


Magnesium  2.0 mg/dL (1.8-2.4)   12/09/20  03:33    


 


Total Bilirubin  0.7 mg/dL (0.2-1.0)   12/07/20  10:13    


 


AST  93 U/L (15-37)  H  12/07/20  10:13    


 


ALT  103 U/L (12-78)  H  12/07/20  10:13    


 


Alkaline Phosphatase  74 U/L ()   12/07/20  10:13    


 


Lipase  250 U/L ()   12/07/20  10:13    








Home Medications: 








RX: allopurinoL [Zyloprim*] 300 mg PO DAILY 09/09/15 


RX: Amitriptyline [Elavil*] 10 mg PO BEDTIME 12/07/20 


RX: Carvedilol [Coreg] 1 tab PO BID 12/07/20 


RX: Finasteride 1 tab PO DAILY 12/07/20 


RX: Losartan/Hydrochlorothiazide [Losartan-Hctz 50-12.5 mg Tab] 50 tab DAILY 

12/07/20 


RX: Metformin ER [Glucophage ER*] 1 tab PO BID 12/07/20 


RX: Apixaban [Eliquis] 5 mg PO BID 30 Days #60 tablet 12/09/20 


predniSONE [Deltasone] 20 mg PO SEECOM 14 Days #21 tab 12/09/20 





New Medications: 


RX: Apixaban [Eliquis] 5 mg PO BID 30 Days #60 tablet


predniSONE [Deltasone] 20 mg PO SEECOM 14 Days #21 tab


Patient Discharge Instructions: follow up with Dr. Mckeon (Pulmonology) in 1-2

 weeks - call his office to schedule appointment.  New medications on discharge:

 prednisone for COVID-19 and Eliquis (Abixaban) - a blood thinner to help 

protect from clots while having COVID-19.


Diet: Regular


Activity: Ad greyson


Followup: 


Ezra Mckeon MD [ACTIVE - CAN ADMIT] - 


Jere Aquino MD [Primary Care Provider] - 


Time spent managing pt's care (in minutes): 40

## 2023-01-01 NOTE — EDPHYS
Physician Documentation                                                                           

 [facility]                                                                 

Name: Grupo Dumont                                                                              

Age: 79 yrs                                                                                       

Sex: Male                                                                                         

: 1940                                                                                   

MRN: [id number]                                                                                   

Arrival Date: 2020                                                                          

Time: 12:21                                                                                       

Account#: [id number]                                                                            

Bed 14                                                                                            

Private MD: Jere Aquino ED Physician Daniel Degroot                                                                      

HPI:                                                                                              

                                                                                             

13:05 This 79 yrs old  Male presents to ER via Ambulatory with complaints of Urinary ana rosa 

      Retention.                                                                                  

13:05 The patient presents with urinary symptoms, dysuria, frequency, hesitancy. Onset: The   ana rosa 

      symptoms/episode began/occurred 2 day(s) ago. Modifying factors: The symptoms are           

      alleviated by nothing, the symptoms are aggravated by urinating. Associated signs and       

      symptoms: The patient has no apparent associated signs or symptoms. Severity of             

      symptoms: At their worst the symptoms were mild, moderate, in the emergency department      

      the symptoms are unchanged. The patient is not sexually active. The patient has not         

      experienced similar symptoms in the past.                                                   

                                                                                                  

Historical:                                                                                       

- Allergies:                                                                                      

12:43 NKA;                                                                                    sg  

- PMHx:                                                                                           

12:43 Hypertension;                                                                           sg  

- PSHx:                                                                                           

12:43 Hernia repair; Right knee sx; cataract sx;                                              sg  

                                                                                                  

- Immunization history:: Adult Immunizations unknown.                                             

- Social history:: Smoking status: Patient/guardian denies using tobacco.                         

- Ebola Screening: : No symptoms or risks identified at this time.                                

                                                                                                  

                                                                                                  

ROS:                                                                                              

13:07 Constitutional: Negative for fever, chills, and weight loss, Eyes: Negative for injury, ana rosa 

      pain, redness, and discharge, ENT: Negative for injury, pain, and discharge, Neck:          

      Negative for injury, pain, and swelling, Cardiovascular: Negative for chest pain,           

      palpitations, and edema, Respiratory: Negative for shortness of breath, cough,              

      wheezing, and pleuritic chest pain, Back: Negative for injury and pain, : Negative        

      for injury, bleeding, discharge, and swelling, MS/Extremity: Negative for injury and        

      deformity, Skin: Negative for injury, rash, and discoloration, Neuro: Negative for          

      headache, weakness, numbness, tingling, and seizure, Psych: Negative for depression,        

      anxiety, suicide ideation, homicidal ideation, and hallucinations, Allergy/Immunology:      

      Negative for hives, rash, and allergies, Endocrine: Negative for neck swelling,             

      polydipsia, polyuria, polyphagia, and marked weight changes, Hematologic/Lymphatic:         

      Negative for swollen nodes, abnormal bleeding, and unusual bruising.                        

13:07 Abdomen/GI: Positive for abdominal pain, of the suprapubic area.                            

                                                                                                  

Exam:                                                                                             

13:07 Constitutional:  This is a well developed, well nourished patient who is awake, alert,  ana rosa 

      and in no acute distress. Head/Face:  Normocephalic, atraumatic. Eyes:  Pupils equal        

      round and reactive to light, extra-ocular motions intact.  Lids and lashes normal.          

      Conjunctiva and sclera are non-icteric and not injected.  Cornea within normal limits.      

      Periorbital areas with no swelling, redness, or edema. ENT:  Nares patent. No nasal         

      discharge, no septal abnormalities noted.  Tympanic membranes are normal and external       

      auditory canals are clear.  Oropharynx with no redness, swelling, or masses, exudates,      

      or evidence of obstruction, uvula midline.  Mucous membranes moist. Neck:  Trachea          

      midline, no thyromegaly or masses palpated, and no cervical lymphadenopathy.  Supple,       

      full range of motion without nuchal rigidity, or vertebral point tenderness.  No            

      Meningismus. Chest/axilla:  Normal chest wall appearance and motion.  Nontender with no     

      deformity.  No lesions are appreciated. Cardiovascular:  Regular rate and rhythm with a     

      normal S1 and S2.  No gallops, murmurs, or rubs.  Normal PMI, no JVD.  No pulse             

      deficits. Respiratory:  Lungs have equal breath sounds bilaterally, clear to                

      auscultation and percussion.  No rales, rhonchi or wheezes noted.  No increased work of     

      breathing, no retractions or nasal flaring. Abdomen/GI:  Soft, non-tender, with normal      

      bowel sounds.  No distension or tympany.  No guarding or rebound.  No evidence of           

      tenderness throughout. Back:  No spinal tenderness.  No costovertebral tenderness.          

      Full range of motion. Skin:  Warm, dry with normal turgor.  Normal color with no            

      rashes, no lesions, and no evidence of cellulitis. MS/ Extremity:  Pulses equal, no         

      cyanosis.  Neurovascular intact.  Full, normal range of motion. Neuro:  Awake and           

      alert, GCS 15, oriented to person, place, time, and situation.  Cranial nerves II-XII       

      grossly intact.  Motor strength 5/5 in all extremities.  Sensory grossly intact.            

      Cerebellar exam normal.  Normal gait. Psych:  Awake, alert, with orientation to person,     

      place and time.  Behavior, mood, and affect are within normal limits.                       

13:07 : CVA tenderness, is absent, Male external genitalia: normal, Bladder: distension,        

      that is mild, tenderness, that is mild.                                                     

                                                                                                  

Vital Signs:                                                                                      

12:41  / 87; Pulse 107; Resp 18; Temp 97.7; Pulse Ox 100% on R/A; Weight 88.45 kg (R);  sg  

      Height 5 ft. 11 in. (180.34 cm);                                                            

14:20  / 72; Pulse 87; Resp 18; Temp 98.0; Pulse Ox 99% on R/A;                         ph  

12:41 Body Mass Index 27.20 (88.45 kg, 180.34 cm)                                             sg  

                                                                                                  

MDM:                                                                                              

12:54 Patient medically screened.                                                             [facility] 

13:07 Data reviewed: vital signs, nurses notes, lab test result(s), urinalysis.               [facility] 

                                                                                                  

                                                                                             

12:56 Order name: Urine Dipstick--Ancillary (enter results); Complete Time: 13:06               

                                                                                             

13:04 Order name: Urine Culture                                                               [facility] 

                                                                                             

13:04 Order name: Bladder Scanner; Complete Time: 13:19                                       [facility] 

                                                                                             

13:07 Order name: Pereira; Complete Time: 14:21                                                 [facility] 

                                                                                             

13:07 Order name: Pereira Leg Bag; Complete Time: 14:21                                         [facility] 

                                                                                                  

Administered Medications:                                                                         

14:00 Drug: Flomax 0.4 mg Route: PO;                                                          ph  

14:21 Follow up: Response: No adverse reaction                                                ph  

14:00 Drug: Cipro 500 mg Route: PO;                                                           ph  

14:22 Follow up: Response: No adverse reaction                                                ph  

14:10 Drug: Viscous Lidocaine Liquid (4 %) 5 ml Route: Mucous Membrane;                       ph  

14:23 Follow up: Response: No adverse reaction                                                ph  

                                                                                                  

                                                                                                  

Disposition:                                                                                      

20 13:10 Discharged to Home. Impression: Retention of urine.                                

- Condition is Stable.                                                                            

- Discharge Instructions: Pereira Catheter Care, Adult, Acute Urinary Retention, Male,              

  Easy-to-Read, Pereira Catheter Care, Adult, Easy-to-Read.                                         

- Prescriptions for Cipro 250 mg Oral Tablet - take 1 tablet by ORAL route every 12               

  hours; 14 tablet. Flomax 0.4 mg Oral Capsule, Sust. Release 24 hr - take 1 capsule by           

  ORAL route once daily 1/2 hour following the same meal each day; 30 capsule.                    

- Medication Reconciliation Form, Thank You Letter, Antibiotic Education, Prescription            

  Opioid Use form.                                                                                

- Follow up: Jere Aquino MD; When: 2 - 3 days; Reason: Recheck today's complaints,             

  Continuance of care, Re-evaluation by your physician. Follow up: Jennifer Pisano MD;           

  When: 2 - 3 days; Reason: Recheck today's complaints, Re-evaluation by your physician.          

- Problem is new.                                                                                 

- Symptoms have improved.                                                                         

                                                                                                  

                                                                                                  

                                                                                                  

Signatures:                                                                                       

Dispatcher [employer]                           EDMS                                                 

Marcial Hankins RN                         RN   Daniel Adam MD MD cha Hall, Patricia, RN                      RN   ph                                                   

                                                                                                  

Corrections: (The following items were deleted from the chart)                                    

14:24 13:10 2020 13:10 Discharged to Home. Impression: Retention of urine. Condition is ph  

      Stable. Forms are Medication Reconciliation Form, Thank You Letter, Antibiotic              

      Education, Prescription Opioid Use. Follow up: Jere Aquino; When: 2 - 3 days; Reason:      

      Recheck today's complaints, Continuance of care, Re-evaluation by your physician.           

      Follow up: Jennifer Pisano; When: 2 - 3 days; Reason: Recheck today's complaints,            

      Re-evaluation by your physician. Problem is new. Symptoms have improved. ana rosa                

                                                                                                  

************************************************************************************************** Resident